# Patient Record
Sex: MALE | Race: WHITE | NOT HISPANIC OR LATINO | Employment: FULL TIME | URBAN - METROPOLITAN AREA
[De-identification: names, ages, dates, MRNs, and addresses within clinical notes are randomized per-mention and may not be internally consistent; named-entity substitution may affect disease eponyms.]

---

## 2017-07-16 ENCOUNTER — OFFICE VISIT (OUTPATIENT)
Dept: URGENT CARE | Age: 57
End: 2017-07-16
Payer: COMMERCIAL

## 2017-07-16 ENCOUNTER — APPOINTMENT (OUTPATIENT)
Dept: RADIOLOGY | Age: 57
End: 2017-07-16
Payer: COMMERCIAL

## 2017-07-16 ENCOUNTER — TRANSCRIBE ORDERS (OUTPATIENT)
Dept: URGENT CARE | Age: 57
End: 2017-07-16

## 2017-07-16 DIAGNOSIS — M25.561 PAIN IN RIGHT KNEE: ICD-10-CM

## 2017-07-16 PROCEDURE — 73564 X-RAY EXAM KNEE 4 OR MORE: CPT

## 2017-07-16 PROCEDURE — 99213 OFFICE O/P EST LOW 20 MIN: CPT

## 2017-07-17 ENCOUNTER — ALLSCRIPTS OFFICE VISIT (OUTPATIENT)
Dept: OTHER | Facility: OTHER | Age: 57
End: 2017-07-17

## 2017-07-27 ENCOUNTER — TRANSCRIBE ORDERS (OUTPATIENT)
Dept: ADMINISTRATIVE | Age: 57
End: 2017-07-27

## 2017-07-27 ENCOUNTER — APPOINTMENT (OUTPATIENT)
Dept: LAB | Age: 57
End: 2017-07-27
Payer: COMMERCIAL

## 2017-07-27 ENCOUNTER — GENERIC CONVERSION - ENCOUNTER (OUTPATIENT)
Dept: OTHER | Facility: OTHER | Age: 57
End: 2017-07-27

## 2017-07-27 DIAGNOSIS — M25.561 PAIN IN RIGHT KNEE: ICD-10-CM

## 2017-07-27 LAB — URATE SERPL-MCNC: 7.5 MG/DL (ref 4.2–8)

## 2017-07-27 PROCEDURE — 36415 COLL VENOUS BLD VENIPUNCTURE: CPT

## 2017-07-27 PROCEDURE — 84550 ASSAY OF BLOOD/URIC ACID: CPT

## 2017-07-31 ENCOUNTER — ALLSCRIPTS OFFICE VISIT (OUTPATIENT)
Dept: OTHER | Facility: OTHER | Age: 57
End: 2017-07-31

## 2017-08-24 ENCOUNTER — TRANSCRIBE ORDERS (OUTPATIENT)
Dept: ADMINISTRATIVE | Age: 57
End: 2017-08-24

## 2017-08-24 ENCOUNTER — APPOINTMENT (OUTPATIENT)
Dept: RADIOLOGY | Age: 57
End: 2017-08-24
Payer: COMMERCIAL

## 2017-08-24 ENCOUNTER — APPOINTMENT (OUTPATIENT)
Dept: LAB | Age: 57
End: 2017-08-24
Payer: COMMERCIAL

## 2017-08-24 DIAGNOSIS — M54.5 LOW BACK PAIN, UNSPECIFIED BACK PAIN LATERALITY, UNSPECIFIED CHRONICITY, WITH SCIATICA PRESENCE UNSPECIFIED: Primary | ICD-10-CM

## 2017-08-24 DIAGNOSIS — M54.5 LOW BACK PAIN, UNSPECIFIED BACK PAIN LATERALITY, UNSPECIFIED CHRONICITY, WITH SCIATICA PRESENCE UNSPECIFIED: ICD-10-CM

## 2017-08-24 LAB
25(OH)D3 SERPL-MCNC: 26.4 NG/ML (ref 30–100)
ALBUMIN SERPL BCP-MCNC: 3.7 G/DL (ref 3.5–5)
ALP SERPL-CCNC: 105 U/L (ref 46–116)
ALT SERPL W P-5'-P-CCNC: 48 U/L (ref 12–78)
ANION GAP SERPL CALCULATED.3IONS-SCNC: 5 MMOL/L (ref 4–13)
AST SERPL W P-5'-P-CCNC: 27 U/L (ref 5–45)
BASOPHILS # BLD AUTO: 0.06 THOUSANDS/ΜL (ref 0–0.1)
BASOPHILS NFR BLD AUTO: 2 % (ref 0–1)
BILIRUB SERPL-MCNC: 0.57 MG/DL (ref 0.2–1)
BILIRUB UR QL STRIP: NEGATIVE
BUN SERPL-MCNC: 15 MG/DL (ref 5–25)
CALCIUM SERPL-MCNC: 9 MG/DL (ref 8.3–10.1)
CHLORIDE SERPL-SCNC: 106 MMOL/L (ref 100–108)
CLARITY UR: CLEAR
CO2 SERPL-SCNC: 30 MMOL/L (ref 21–32)
COLOR UR: YELLOW
CREAT SERPL-MCNC: 1.17 MG/DL (ref 0.6–1.3)
EOSINOPHIL # BLD AUTO: 0.16 THOUSAND/ΜL (ref 0–0.61)
EOSINOPHIL NFR BLD AUTO: 5 % (ref 0–6)
ERYTHROCYTE [DISTWIDTH] IN BLOOD BY AUTOMATED COUNT: 13.5 % (ref 11.6–15.1)
ERYTHROCYTE [SEDIMENTATION RATE] IN BLOOD: 2 MM/HOUR (ref 0–10)
GFR SERPL CREATININE-BSD FRML MDRD: 70 ML/MIN/1.73SQ M
GLUCOSE P FAST SERPL-MCNC: 110 MG/DL (ref 65–99)
GLUCOSE UR STRIP-MCNC: NEGATIVE MG/DL
HCT VFR BLD AUTO: 45.1 % (ref 36.5–49.3)
HGB BLD-MCNC: 16 G/DL (ref 12–17)
HGB UR QL STRIP.AUTO: NEGATIVE
KETONES UR STRIP-MCNC: NEGATIVE MG/DL
LEUKOCYTE ESTERASE UR QL STRIP: NEGATIVE
LYMPHOCYTES # BLD AUTO: 1.44 THOUSANDS/ΜL (ref 0.6–4.47)
LYMPHOCYTES NFR BLD AUTO: 41 % (ref 14–44)
MCH RBC QN AUTO: 30.9 PG (ref 26.8–34.3)
MCHC RBC AUTO-ENTMCNC: 35.5 G/DL (ref 31.4–37.4)
MCV RBC AUTO: 87 FL (ref 82–98)
MONOCYTES # BLD AUTO: 0.5 THOUSAND/ΜL (ref 0.17–1.22)
MONOCYTES NFR BLD AUTO: 14 % (ref 4–12)
NEUTROPHILS # BLD AUTO: 1.34 THOUSANDS/ΜL (ref 1.85–7.62)
NEUTS SEG NFR BLD AUTO: 38 % (ref 43–75)
NITRITE UR QL STRIP: NEGATIVE
NRBC BLD AUTO-RTO: 0 /100 WBCS
PH UR STRIP.AUTO: 6 [PH] (ref 4.5–8)
PLATELET # BLD AUTO: 162 THOUSANDS/UL (ref 149–390)
PMV BLD AUTO: 10.1 FL (ref 8.9–12.7)
POTASSIUM SERPL-SCNC: 4.3 MMOL/L (ref 3.5–5.3)
PROT SERPL-MCNC: 6.7 G/DL (ref 6.4–8.2)
PROT UR STRIP-MCNC: NEGATIVE MG/DL
RBC # BLD AUTO: 5.17 MILLION/UL (ref 3.88–5.62)
SODIUM SERPL-SCNC: 141 MMOL/L (ref 136–145)
SP GR UR STRIP.AUTO: 1.01 (ref 1–1.03)
URATE SERPL-MCNC: 2.8 MG/DL (ref 4.2–8)
UROBILINOGEN UR QL STRIP.AUTO: 0.2 E.U./DL
WBC # BLD AUTO: 3.51 THOUSAND/UL (ref 4.31–10.16)

## 2017-08-24 PROCEDURE — 85025 COMPLETE CBC W/AUTO DIFF WBC: CPT

## 2017-08-24 PROCEDURE — 36415 COLL VENOUS BLD VENIPUNCTURE: CPT

## 2017-08-24 PROCEDURE — 82306 VITAMIN D 25 HYDROXY: CPT

## 2017-08-24 PROCEDURE — 72200 X-RAY EXAM SI JOINTS: CPT

## 2017-08-24 PROCEDURE — 85652 RBC SED RATE AUTOMATED: CPT

## 2017-08-24 PROCEDURE — 72110 X-RAY EXAM L-2 SPINE 4/>VWS: CPT

## 2017-08-24 PROCEDURE — 81003 URINALYSIS AUTO W/O SCOPE: CPT | Performed by: INTERNAL MEDICINE

## 2017-08-24 PROCEDURE — 80053 COMPREHEN METABOLIC PANEL: CPT

## 2017-08-24 PROCEDURE — 84550 ASSAY OF BLOOD/URIC ACID: CPT

## 2017-09-09 ENCOUNTER — APPOINTMENT (OUTPATIENT)
Dept: LAB | Age: 57
End: 2017-09-09
Payer: COMMERCIAL

## 2017-09-09 ENCOUNTER — TRANSCRIBE ORDERS (OUTPATIENT)
Dept: ADMINISTRATIVE | Age: 57
End: 2017-09-09

## 2017-09-09 DIAGNOSIS — Z83.3 FAMILY HISTORY OF DIABETES MELLITUS: Primary | ICD-10-CM

## 2017-09-09 DIAGNOSIS — Z83.3 FAMILY HISTORY OF DIABETES MELLITUS: ICD-10-CM

## 2017-09-09 LAB
CHOLEST SERPL-MCNC: 116 MG/DL (ref 50–200)
EST. AVERAGE GLUCOSE BLD GHB EST-MCNC: 105 MG/DL
HBA1C MFR BLD: 5.3 % (ref 4.2–6.3)
HDLC SERPL-MCNC: 33 MG/DL (ref 40–60)
LDLC SERPL CALC-MCNC: 54 MG/DL (ref 0–100)
T4 FREE SERPL-MCNC: 1.09 NG/DL (ref 0.76–1.46)
TRIGL SERPL-MCNC: 143 MG/DL
TSH SERPL DL<=0.05 MIU/L-ACNC: 5.02 UIU/ML (ref 0.36–3.74)

## 2017-09-09 PROCEDURE — 84439 ASSAY OF FREE THYROXINE: CPT

## 2017-09-09 PROCEDURE — 84443 ASSAY THYROID STIM HORMONE: CPT

## 2017-09-09 PROCEDURE — 80061 LIPID PANEL: CPT

## 2017-09-09 PROCEDURE — 36415 COLL VENOUS BLD VENIPUNCTURE: CPT

## 2017-09-09 PROCEDURE — 83036 HEMOGLOBIN GLYCOSYLATED A1C: CPT

## 2017-11-07 ENCOUNTER — HOSPITAL ENCOUNTER (EMERGENCY)
Facility: HOSPITAL | Age: 57
Discharge: HOME | End: 2017-11-07
Attending: EMERGENCY MEDICINE | Admitting: EMERGENCY MEDICINE
Payer: COMMERCIAL

## 2017-11-07 VITALS
WEIGHT: 185.19 LBS | DIASTOLIC BLOOD PRESSURE: 64 MMHG | HEART RATE: 79 BPM | OXYGEN SATURATION: 97 % | TEMPERATURE: 97.6 F | RESPIRATION RATE: 20 BRPM | SYSTOLIC BLOOD PRESSURE: 113 MMHG

## 2017-11-07 DIAGNOSIS — R55 VASOVAGAL EPISODE: ICD-10-CM

## 2017-11-07 DIAGNOSIS — I95.9 HYPOTENSION: Primary | ICD-10-CM

## 2017-11-07 LAB
ANION GAP SERPL CALCULATED.3IONS-SCNC: 8 MMOL/L (ref 4–13)
APTT PPP: 26 SECONDS (ref 23–35)
ATRIAL RATE: 67 BPM
BASOPHILS # BLD AUTO: 0.04 THOUSANDS/ΜL (ref 0–0.1)
BASOPHILS NFR BLD AUTO: 1 % (ref 0–1)
BUN SERPL-MCNC: 15 MG/DL (ref 5–25)
CALCIUM SERPL-MCNC: 9.3 MG/DL (ref 8.3–10.1)
CHLORIDE SERPL-SCNC: 106 MMOL/L (ref 100–108)
CO2 SERPL-SCNC: 29 MMOL/L (ref 21–32)
CREAT SERPL-MCNC: 1.28 MG/DL (ref 0.6–1.3)
EOSINOPHIL # BLD AUTO: 0.14 THOUSAND/ΜL (ref 0–0.61)
EOSINOPHIL NFR BLD AUTO: 4 % (ref 0–6)
ERYTHROCYTE [DISTWIDTH] IN BLOOD BY AUTOMATED COUNT: 12.2 % (ref 11.6–15.1)
GFR SERPL CREATININE-BSD FRML MDRD: 62 ML/MIN/1.73SQ M
GLUCOSE SERPL-MCNC: 142 MG/DL (ref 65–140)
HCT VFR BLD AUTO: 40.8 % (ref 36.5–49.3)
HGB BLD-MCNC: 14.7 G/DL (ref 12–17)
INR PPP: 1.04 (ref 0.86–1.16)
LYMPHOCYTES # BLD AUTO: 1.58 THOUSANDS/ΜL (ref 0.6–4.47)
LYMPHOCYTES NFR BLD AUTO: 51 % (ref 14–44)
MCH RBC QN AUTO: 31.1 PG (ref 26.8–34.3)
MCHC RBC AUTO-ENTMCNC: 36 G/DL (ref 31.4–37.4)
MCV RBC AUTO: 86 FL (ref 82–98)
MONOCYTES # BLD AUTO: 0.43 THOUSAND/ΜL (ref 0.17–1.22)
MONOCYTES NFR BLD AUTO: 14 % (ref 4–12)
NEUTROPHILS # BLD AUTO: 0.96 THOUSANDS/ΜL (ref 1.85–7.62)
NEUTS SEG NFR BLD AUTO: 30 % (ref 43–75)
P AXIS: 50 DEGREES
PLATELET # BLD AUTO: 198 THOUSANDS/UL (ref 149–390)
PMV BLD AUTO: 9.9 FL (ref 8.9–12.7)
POTASSIUM SERPL-SCNC: 3.8 MMOL/L (ref 3.5–5.3)
PR INTERVAL: 176 MS
PROTHROMBIN TIME: 13.9 SECONDS (ref 12.1–14.4)
QRS AXIS: 43 DEGREES
QRSD INTERVAL: 92 MS
QT INTERVAL: 384 MS
QTC INTERVAL: 405 MS
RBC # BLD AUTO: 4.72 MILLION/UL (ref 3.88–5.62)
SODIUM SERPL-SCNC: 143 MMOL/L (ref 136–145)
T WAVE AXIS: 59 DEGREES
TROPONIN I SERPL-MCNC: <0.02 NG/ML
VENTRICULAR RATE: 67 BPM
WBC # BLD AUTO: 3.15 THOUSAND/UL (ref 4.31–10.16)

## 2017-11-07 PROCEDURE — 85730 THROMBOPLASTIN TIME PARTIAL: CPT | Performed by: EMERGENCY MEDICINE

## 2017-11-07 PROCEDURE — 99284 EMERGENCY DEPT VISIT MOD MDM: CPT

## 2017-11-07 PROCEDURE — 93005 ELECTROCARDIOGRAM TRACING: CPT | Performed by: EMERGENCY MEDICINE

## 2017-11-07 PROCEDURE — 85025 COMPLETE CBC W/AUTO DIFF WBC: CPT | Performed by: EMERGENCY MEDICINE

## 2017-11-07 PROCEDURE — 85610 PROTHROMBIN TIME: CPT | Performed by: EMERGENCY MEDICINE

## 2017-11-07 PROCEDURE — 80048 BASIC METABOLIC PNL TOTAL CA: CPT | Performed by: EMERGENCY MEDICINE

## 2017-11-07 PROCEDURE — 84484 ASSAY OF TROPONIN QUANT: CPT | Performed by: EMERGENCY MEDICINE

## 2017-11-07 PROCEDURE — 36415 COLL VENOUS BLD VENIPUNCTURE: CPT | Performed by: EMERGENCY MEDICINE

## 2017-11-07 PROCEDURE — 96360 HYDRATION IV INFUSION INIT: CPT

## 2017-11-07 RX ORDER — GABAPENTIN 300 MG/1
100 CAPSULE ORAL 3 TIMES DAILY
COMMUNITY

## 2017-11-07 RX ORDER — LEVOTHYROXINE SODIUM 0.1 MG/1
100 TABLET ORAL DAILY
COMMUNITY

## 2017-11-07 RX ORDER — ATORVASTATIN CALCIUM 10 MG/1
10 TABLET, FILM COATED ORAL DAILY
COMMUNITY

## 2017-11-07 RX ORDER — FEBUXOSTAT 80 MG/1
80 TABLET, FILM COATED ORAL DAILY
COMMUNITY
End: 2022-05-06

## 2017-11-07 RX ADMIN — SODIUM CHLORIDE 1000 ML: 0.9 INJECTION, SOLUTION INTRAVENOUS at 04:06

## 2017-11-07 NOTE — ED PROVIDER NOTES
History  Chief Complaint   Patient presents with    Syncope     EMS reports syncopal episode this am after getting up to use restroom  Pt reports diaphoresis upon episode  EMS reports when they arrived on scene be was orthostatically hypotensive, went sitting up ems reports pts systolic bp was 79 and pt had witnessed syncopal episdoe  In ED pt c/o a little lightheadedness but denies CP/SOB  Patient presents to        the emergency department via ambulance for evaluation of a near syncopal episode which occurred 2 times prior to arrival   Patient states he got up to go to the bathroom and while standing and urinating he bent over to  a tissue and got dizzy and almost passed out  His wife heard a thump as the patient bumped into the wall and window and when she went in he dropped to the ground but did not pass out and was able to converse throughout  He did give blood last evening and has not been drinking aggressive fluids  Currently he feels fatigued and cold but denies chest pain palpitations jaw arm neck pain  He denies dizziness or lightheadedness  He denies abdominal pain  He denies ill contacts  He denies fever chills and rash  He denies injury from the fall  Prior to Admission Medications   Prescriptions Last Dose Informant Patient Reported? Taking?   atorvastatin (LIPITOR) 10 mg tablet   Yes Yes   Sig: Take 10 mg by mouth daily   febuxostat (ULORIC) 80 MG TABS   Yes Yes   Sig: Take 80 mg by mouth daily   gabapentin (NEURONTIN) 300 mg capsule   Yes Yes   Sig: Take 100 mg by mouth 3 (three) times a day   levothyroxine 100 mcg tablet   Yes Yes   Sig: Take 100 mcg by mouth daily      Facility-Administered Medications: None       Past Medical History:   Diagnosis Date    Disease of thyroid gland     hypo    Hyperlipidemia     Neuropathy        Past Surgical History:   Procedure Laterality Date    TONSILLECTOMY         History reviewed  No pertinent family history    I have reviewed and agree with the history as documented  Social History   Substance Use Topics    Smoking status: Never Smoker    Smokeless tobacco: Never Used    Alcohol use Yes      Comment: "infrequent"        Review of Systems   Constitutional: Positive for fatigue  Negative for activity change, appetite change, chills, diaphoresis and fever  HENT: Negative  Negative for rhinorrhea  Eyes: Negative  Negative for photophobia and visual disturbance  Respiratory: Negative  Negative for cough, choking, chest tightness, shortness of breath, wheezing and stridor  Cardiovascular: Negative  Negative for chest pain, palpitations and leg swelling  Gastrointestinal: Negative  Negative for abdominal pain, blood in stool, constipation, diarrhea, nausea, rectal pain and vomiting  Endocrine: Negative  Genitourinary: Negative  Negative for dysuria, frequency and urgency  Musculoskeletal: Negative  Negative for back pain, neck pain and neck stiffness  Skin: Negative  Negative for rash and wound  Allergic/Immunologic: Negative  Neurological: Positive for dizziness, weakness and light-headedness  Negative for tremors, seizures, syncope, speech difficulty, numbness and headaches  Hematological: Negative  Does not bruise/bleed easily  Psychiatric/Behavioral: Negative          Physical Exam  ED Triage Vitals [11/07/17 0350]   Temperature Pulse Respirations Blood Pressure SpO2   97 6 °F (36 4 °C) 72 16 146/65 97 %      Temp Source Heart Rate Source Patient Position - Orthostatic VS BP Location FiO2 (%)   Oral Monitor Lying Right arm --      Pain Score       No Pain           Orthostatic Vital Signs  Vitals:    11/07/17 0437 11/07/17 0438 11/07/17 0439 11/07/17 0515   BP: (!) 76/53 104/58 100/57 113/64   Pulse: 88 70 79 79   Patient Position - Orthostatic VS: Standing - Orthostatic VS Lying - Orthostatic VS Sitting - Orthostatic VS Standing - Orthostatic VS       Physical Exam   Constitutional: He is oriented to person, place, and time  He appears well-developed and well-nourished  Nontoxic appearance without respiratory distress  Answers simple questions appropriately and engages in normal conversation  He moves all extremities spontaneously and to command  HENT:   Head: Normocephalic and atraumatic  Right Ear: External ear normal    Left Ear: External ear normal    Mouth/Throat: Oropharynx is clear and moist    No evidence of head or scalp or facial trauma   Eyes: Conjunctivae and EOM are normal  Pupils are equal, round, and reactive to light  Neck: Normal range of motion  Neck supple  Cardiovascular: Normal rate, regular rhythm, normal heart sounds and intact distal pulses  Pulmonary/Chest: Effort normal and breath sounds normal  No respiratory distress  He has no wheezes  He has no rales  He exhibits no tenderness  Abdominal: Soft  Bowel sounds are normal  He exhibits no distension and no mass  There is no tenderness  There is no rebound and no guarding  No hernia  Musculoskeletal: Normal range of motion  Neurological: He is alert and oriented to person, place, and time  He has normal reflexes  He displays normal reflexes  No cranial nerve deficit or sensory deficit  He exhibits normal muscle tone  Coordination normal    Skin: Skin is warm and dry  No rash noted  No erythema  No pallor  Psychiatric: He has a normal mood and affect  His behavior is normal  Judgment and thought content normal    Nursing note and vitals reviewed        ED Medications  Medications   sodium chloride 0 9 % bolus 1,000 mL (0 mL Intravenous Stopped 11/7/17 0515)       Diagnostic Studies  Results Reviewed     Procedure Component Value Units Date/Time    Protime-INR [[id number]]  (Normal) Collected:  11/07/17 0425    Lab Status:  Final result Specimen:  Blood from Arm, Right Updated:  11/07/17 0443     Protime 13 9 seconds      INR 1 04    APTT [[id number]]  (Normal) Collected:  11/07/17 0425    Lab Status:  Final result Specimen:  Blood from Arm, Right Updated:  11/07/17 0443     PTT 26 seconds     Narrative: Therapeutic Heparin Range = 60-90 seconds    Troponin I [[id number]]  (Normal) Collected:  11/07/17 0358    Lab Status:  Final result Specimen:  Blood from Arm, Left Updated:  11/07/17 0424     Troponin I <0 02 ng/mL     Narrative:         [employer] Chemistry analyzer 99% cutoff is > 0 04 ng/mL in network labs    o cTnI 99% cutoff is useful only when applied to patients in the clinical setting of myocardial ischemia  o cTnI 99% cutoff should be interpreted in the context of clinical history, ECG findings and possibly cardiac imaging to establish correct diagnosis  o cTnI 99% cutoff may be suggestive but clearly not indicative of a coronary event without the clinical setting of myocardial ischemia  Basic metabolic panel [[id number]]  (Abnormal) Collected:  11/07/17 0358    Lab Status:  Final result Specimen:  Blood from Arm, Left Updated:  11/07/17 0416     Sodium 143 mmol/L      Potassium 3 8 mmol/L      Chloride 106 mmol/L      CO2 29 mmol/L      Anion Gap 8 mmol/L      BUN 15 mg/dL      Creatinine 1 28 mg/dL      Glucose 142 (H) mg/dL      Calcium 9 3 mg/dL      eGFR 62 ml/min/1 73sq m     Narrative:         National Kid[employer] Disease Education Program recommendations are as follows:  GFR calculation is accurate only with a steady state creatinine  Chronic Kidney disease less than 60 ml/min/1 73 sq  meters  Kidney failure less than 15 ml/min/1 73 sq  meters      CBC and differential [[id number]]  (Abnormal) Collected:  11/07/17 0358    Lab Status:  Final result Specimen:  Blood from Arm, Left Updated:  11/07/17 0404     WBC 3 15 (L) Thousand/uL      RBC 4 72 Million/uL      Hemoglobin 14 7 g/dL      Hematocrit 40 8 %      MCV 86 fL      MCH 31 1 pg      MCHC 36 0 g/dL      RDW 12 2 %      MPV 9 9 fL      Platelets 444 Thousands/uL      Neutrophils Relative 30 (L) %      Lymphocytes Relative 51 (H) %      Monocytes Relative 14 (H) %      Eosinophils Relative 4 %      Basophils Relative 1 %      Neutrophils Absolute 0 96 (L) Thousands/µL      Lymphocytes Absolute 1 58 Thousands/µL      Monocytes Absolute 0 43 Thousand/µL      Eosinophils Absolute 0 14 Thousand/µL      Basophils Absolute 0 04 Thousands/µL                  No orders to display              Procedures  ECG 12 Lead Documentation  Date/Time: 11/7/2017 3:52 AM  Performed by: Jenny Fenton  Authorized by: Jenny Fenton     ECG reviewed by me, the ED Provider: yes    Patient location:  ED  Previous ECG:     Previous ECG:  Unavailable  Interpretation:     Interpretation: normal    Rate:     ECG rate:  67    ECG rate assessment: normal    Rhythm:     Rhythm: sinus rhythm    Ectopy:     Ectopy: none    QRS:     QRS axis:  Normal    QRS intervals:  Normal  Conduction:     Conduction: normal    ST segments:     ST segments:  Normal  T waves:     T waves: normal             Phone Contacts  ED Phone Contact    ED Course  ED Course as of Nov 07 0517 Tue Nov 07, 2017   1127 Patient is stable for discharge  His blood pressure has improved specially standing up  He is asymptomatic after fluids  His blood work and troponin and EKG are unremarkable  I suspect a vasovagal etiology to his presentation  I discussed signs and symptoms that would require return to the emergency department  I encouraged aggressive fluid intake  MDM  CritCare Time    Disposition  Final diagnoses:   Hypotension   Vasovagal episode     Time reflects when diagnosis was documented in both MDM as applicable and the Disposition within this note     Time User Action Codes Description Comment    11/7/2017  5:14 AM Kayla Bacon Add [I95 9] Hypotension     11/7/2017  5:15 AM Constance Shah Add [R55] Vasovagal episode       ED Disposition     ED Disposition Condition Comment    Discharge  Tonyelyalicia Brooks discharge to home/self care      Condition at discharge: Stable Follow-up Information     Follow up With Specialties Details Why Contact Info    Private physician  Schedule an appointment as soon as possible for a visit          Patient's Medications   Discharge Prescriptions    No medications on file     No discharge procedures on file      ED Provider  Electronically Signed by           Marion Monahan MD  11/07/17 9691       Marion Monahan MD  11/07/17 9755

## 2017-11-07 NOTE — DISCHARGE INSTRUCTIONS
Hypotension   WHAT YOU NEED TO KNOW:   What is hypotension? Hypotension is a condition that causes your blood pressure (BP) to drop lower than it should be  Hypotension may be mild, serious, or life-threatening  What are the most common types of hypotension? · Acute:  Acute hypotension is a sudden drop in your BP that may be life-threatening  · Constitutional:  Constitutional hypotension means your BP is lower than it should be most or all of the time  This is a chronic condition that occurs with no known medical cause  · Orthostatic:  Orthostatic hypotension normally occurs when you stand up from a sitting or lying position  It is also called postural hypotension  · Postprandial:  Postprandial hypotension means your BP becomes too low after you eat a meal  Your BP may drop within 2 hours after you eat, and is more common when you eat meals high in carbohydrates  What causes hypotension? · Anemia or blood loss    · Nervous system, heart, or adrenal disorders    · Dehydration from not drinking enough liquids, frequent vomiting, diarrhea, or severe burns    · Some medicines, such as those used to treat high blood pressure, heart conditions, pain, depression, or cancer    · A blood infection (sepsis)  What increases my risk for hypotension? · Increasing age    · Drug and alcohol use    · Being bedridden for a long period of time    · Low body weight    · Hemodialysis    · Medical conditions such as diabetes, Parkinson disease, and Alzheimer disease  What are the signs and symptoms of hypotension? · Feeling anxious, nauseated, tired, or weak    · Lightheadedness, dizziness, or fainting    · Increased sweating, palpitations (fast, forceful heartbeats), tremors, or a seizure     · Headache or pain in your neck, shoulders, chest, lower back, buttocks, or legs    · Blurred vision or changes in vision    · Confusion, decreased memory, or inability to pay attention  How is hypotension diagnosed?   Your healthcare provider will ask about your symptoms, health conditions, and medicines  Tell him how often you have symptoms, and if they change during the day  Tell him if you recently have had diarrhea, vomiting, or blood loss  Your healthcare provider will carefully examine you, listen to your heart, and may check your eyes  He may check your body movements and sensations (ability to feel something that touches you)  You may also need the following:  · Blood pressure testing: This may be done while you lie down, sit, and stand  You may need to wear a BP monitor to record your BP for up to 24 hours  · Tilt table testing: You are secured on a table that changes your position  Your BP is checked when the table moves you into each position  What tests may help find the cause of my hypotension? Many times, hypotension is a symptom of another condition  You may need any of the following tests to find the cause of your hypotension:  · Blood tests:  A sample of your blood or urine may be checked for anemia or other conditions causing your hypotension  · EKG: This test records the electrical activity of your heart  It is used to check for damage or other heart problems that may be causing your hypotension  · Autonomic nervous system tests:  Your healthcare provider may check for changes in how fast your heart beats when you take deep breaths  He may also check for changes in your BP while you put your hand in ice cold water  · 24 hour urine test: During this test you will need to collect all of your urine for 24 hours  You will urinate into a container and the urine will be put into a jug  The jug will need to be kept cold  If you urinate during the night, you will need to save that urine  Caregivers will measure and record how much you urinate  At the end of 24 hours, the urine will be sent to a lab for tests  · Echocardiogram:  This is a type of ultrasound, also called an echo   An ultrasound uses sound waves to show pictures of your heart on a monitor  An ultrasound may be done to show how your heart moves when it beats  How is hypotension treated? Your healthcare provider will work with you to find the cause of your hypotension and help treat your symptoms  You may need the following:  · Compression stockings or abdominal binder: These may help blood return to your heart and decrease your hypotension  · IV fluids: These may be used to increase your BP if you are dehydrated or have blood loss or sepsis  · Medicines:      ¨ Alpha-adrenoreceptor agonists: These medicines may increase your BP and decrease your symptoms  ¨ Steroids: This medicine helps prevent salt loss from your body  Steroids may also help increase the amount of fluid in your body and raise your BP  ¨ Vasopressors: These medicines help constrict (make smaller) your blood vessels and increase your BP  Vasopressor medicines may increase the blood flow to your brain and help decrease your symptoms  ¨ Antidiuretic hormone: This medicine helps control your BP and helps decrease your need to urinate during the night  ¨ Antiparkinson medicine: This medicine may help increase your standing BP and decrease your symptoms  What are the risks of hypotension? Without treatment, your symptoms may get worse  You may faint or fall often, which can lead to injuries, such as a broken bone  You may be at increased risk for depression, confusion, and memory problems  Hypotension may cause decreased blood flow to your brain and heart  This may lead to a stroke or heart attack and can be life-threatening  Sepsis-related hypotension is life-threatening without treatment  How can I manage my symptoms? · Change your position slowly:  When you get out of bed, sit up first, then slowly move your legs to the side of the bed  If you are not having any symptoms, slowly stand up  If you have symptoms, sit down right away      · Avoid straining: Activities and movements that cause you to strain can cause a drop in your BP  Activities to avoid include lifting, coughing, and other movements that increase the feeling of pressure in your chest     · Avoid the heat: This can cause a decrease in your BP  Stay inside during very hot days, or limit the amount of time you are outside  Do not take hot baths  · Exercise and do physical counter maneuvers:  Ask your healthcare provider about the best exercise plan for you  Physical counter maneuvers may help to increase your BP and increase blood flow to your heart  They include crossing your legs, squatting, and bending at the waist  You can also rise up on your toes while you are standing, and tighten your thigh muscles  · Drink liquids as directed:  Ask your healthcare provider how much liquid to drink each day and which liquids are best for you  Drink 500 milliliters (½ liter) of liquid quickly in the morning or before meals to help increase your BP  Your healthcare provider may tell you to drink 2 cups of coffee with, or after, breakfast and lunch  The caffeine in the coffee can help prevent a drop in your BP  Your healthcare provider may also give you caffeine pills  · Change how you eat meals: If your BP drops after you eat large meals, try to eat smaller meals more often  Eat foods low in carbohydrates and cholesterol to help prevent BP drops after you eat  Ask if you need to increase the amount of sodium (salt) you eat each day  · Raise the head of your bed:  Raise the head of your bed 4 to 8 inches  This can help prevent morning BP drops and decrease the need to urinate during the night  · Do not drink alcohol:  Alcohol can make your symptoms worse  Ask for information if you need help quitting  When should I contact my healthcare provider? · You vomit several times or have diarrhea, and you cannot drink liquid  · You have a fever       · You have new or increased symptoms, such as dizziness, weakness, or fainting  · Your legs, ankles, and feet are swollen, or you gain weight for no known reason  · You have questions or concerns about your condition or care  When should I seek immediate care or call 911? · You become confused or cannot speak  · You urinate very little or not at all  · You have a seizure  · You have chest pain or trouble breathing  · You have changes in vision or cannot see  CARE AGREEMENT:   You have the right to help plan your care  Learn about your health condition and how it may be treated  Discuss treatment options with your caregivers to decide what care you want to receive  You always have the right to refuse treatment  The above information is an  only  It is not intended as medical advice for individual conditions or treatments  Talk to your doctor, nurse or pharmacist before following any medical regimen to see if it is safe and effective for you  © 2017 [employer] Information is for End User's use only and may not be sold, redistributed or otherwise used for commercial purposes  All illustrations and images included in CareNotes® are the copyrighted property of [employer]  or [employer]  Near Syncope   WHAT YOU NEED TO KNOW:   What is near syncope? Near syncope, also called presyncope, is the feeling that you may faint (lose consciousness), but you do not  Each time you have this feeling is called a near syncope episode  What increases my risk for near syncope? Near syncope is often caused by a drop in your blood pressure that happens when you stand up quickly  The following can increase your risk for near syncope:  · Certain medicines, such as medicine to lower your blood pressure    · Dehydration    · Low sodium or blood sugar levels    · An abnormal heart rhythm    · Hyperventilation (breathing too quickly)  What signs and symptoms may occur before a near syncope episode?    · Feeling dizzy or lightheaded    · Nausea, feeling warm, sweating, or clammy skin    · Blurred vision, or vision that is blacking out    · Confusion    · Trouble breathing    · A fast or fluttering heartbeat, chest pain, or a weak pulse  How is the cause of near syncope diagnosed? Your healthcare provider will examine you  He or she will ask if you have other medical conditions  He or she may order the following tests to find out what is causing your symptoms:  · Blood tests  may be done to check your electrolyte levels, such as sodium  A problem with your electrolytes can lead to syncope  · Telemetry  is continuous monitoring of your heart rhythm  Sticky pads placed on your skin connect to an EKG machine that records your heart rhythm  · An echocardiogram  is a type of ultrasound  Sound waves are used to show the structure and function of your heart  · A stress test  may show the changes that take place in your heart while it is under stress  Stress may be placed on your heart with exercise or medicine  Ask for more information about this test     · A tilt table test  is used to check your heart and your blood pressure when you change positions  You will lie on a table during this test  The table will move in different positions  The strength and rhythm of your heartbeat will be measured as the table moves  How is near syncope treated? Treatment depends on the cause of your near syncope  You may  need any of the following:  · Medicines  may be needed to help your heart pump strongly and regularly  Your healthcare provider may also make changes to any medicines that are causing syncope  · Tilt training  involves training yourself to stand for 10 to 30 minutes each day against a wall  This helps your body decrease the effects of posture changes and reduces the number of fainting spells  What can I do to manage near syncope? · Keep a record of your near syncope episodes    Include your symptoms and your activity before and after the episode  The record can help your healthcare provider find the cause of your near syncope and help you manage episodes  · Sit or lie down when needed  This includes when you feel dizzy, your throat is getting tight, and your vision changes  Raise your legs above the level of your heart  · Take slow, deep breaths if you start to breathe faster with anxiety or fear  This can help decrease dizziness and the feeling that you might faint  · Check your blood pressure often  This is important if you take medicine to lower your blood pressure  Check your blood pressure when you are lying down and when you are standing  Ask how often to check during the day  Keep a record of your blood pressure numbers  Your healthcare provider may use the record to help plan your treatment  What can I do to prevent a near syncope episode? · Move slowly and let yourself get used to one position before you move to another position  This is very important when you change from a lying or sitting position to a standing position  Take some deep breaths before you stand up from a lying position  Stand up slowly  Sudden movements may cause a fainting spell  Sit on the side of the bed or couch for a few minutes before you stand up  If you are on bedrest, try to be upright for about 2 hours each day, or as directed  Do not lock your legs if you are standing for a long period of time  Move your legs and bend your knees to keep blood flowing  · Follow your healthcare provider's recommendations  Your provider may  recommend that you drink more liquids to prevent dehydration  You may also need to have more salt to keep your blood pressure from dropping too low and causing syncope  Your provider will tell you how much liquid and sodium to have each day  · Watch for signs of low blood sugar  These include hunger, nervousness, sweating, and fast or fluttery heartbeats   Talk with your healthcare provider about ways to keep your blood sugar level steady  · Do not strain if you are constipated  You may faint if you strain to have a bowel movement  Walking is the best way to get your bowels moving  Eat foods high in fiber to make it easier to have a bowel movement  Good examples are high-fiber cereals, beans, vegetables, and whole-grain breads  Prune juice may help make bowel movements softer  · Be careful in hot weather  Heat can cause a syncope episode  Limit activity done outside on hot days  Physical activity in hot weather can lead to dehydration  This can cause an episode  When should I seek immediate care? · You have sudden chest pain  · You have trouble breathing or shortness of breath  · You have vision changes, are sweating, and have nausea while you are sitting or lying down  · You feel dizzy or flushed and your heart is fluttering  · You lose consciousness  · You cannot use your arm, hand, foot, or leg, or it feels weak  · You have trouble speaking or understanding others when they speak  When should I contact my healthcare provider? · You have new or worsening symptoms  · Your heart beats faster or slower than usual      · You have questions or concerns about your condition or care  CARE AGREEMENT:   You have the right to help plan your care  Learn about your health condition and how it may be treated  Discuss treatment options with your caregivers to decide what care you want to receive  You always have the right to refuse treatment  The above information is an  only  It is not intended as medical advice for individual conditions or treatments  Talk to your doctor, nurse or pharmacist before following any medical regimen to see if it is safe and effective for you  © 2017 [employer] Information is for End User's use only and may not be sold, redistributed or otherwise used for commercial purposes   All illustrations and images included in [employer] [address] are the copyrighted property of [employer]  or [employer]

## 2017-11-07 NOTE — ED NOTES
ORTHOSTATIC VITAL SIGNS BLOOD PRESSURE HEART RATE           LYING 104/58 70           SITTING 100/57 [address], [facility]  11/07/17 5304

## 2017-11-07 NOTE — ED NOTES
ORTHOSTATIC VITAL SIGNS BLOOD PRESSURE HEART RATE           LYING 104/58 70           SITTING 100/57 [address] [facility] [address], [facility]  11/07/17 2496

## 2017-11-10 ENCOUNTER — GENERIC CONVERSION - ENCOUNTER (OUTPATIENT)
Dept: OTHER | Facility: OTHER | Age: 57
End: 2017-11-10

## 2017-11-11 ENCOUNTER — APPOINTMENT (OUTPATIENT)
Dept: LAB | Age: 57
End: 2017-11-11
Payer: COMMERCIAL

## 2017-11-11 ENCOUNTER — TRANSCRIBE ORDERS (OUTPATIENT)
Dept: ADMINISTRATIVE | Age: 57
End: 2017-11-11

## 2017-11-11 DIAGNOSIS — E03.9 HYPOTHYROIDISM, UNSPECIFIED TYPE: ICD-10-CM

## 2017-11-11 DIAGNOSIS — E03.9 HYPOTHYROIDISM, UNSPECIFIED TYPE: Primary | ICD-10-CM

## 2017-11-11 LAB
T4 FREE SERPL-MCNC: 0.99 NG/DL (ref 0.76–1.46)
TSH SERPL DL<=0.05 MIU/L-ACNC: 1.02 UIU/ML (ref 0.36–3.74)

## 2017-11-11 PROCEDURE — 84443 ASSAY THYROID STIM HORMONE: CPT

## 2017-11-11 PROCEDURE — 36415 COLL VENOUS BLD VENIPUNCTURE: CPT

## 2017-11-11 PROCEDURE — 84439 ASSAY OF FREE THYROXINE: CPT

## 2018-01-13 VITALS
SYSTOLIC BLOOD PRESSURE: 130 MMHG | TEMPERATURE: 98.3 F | DIASTOLIC BLOOD PRESSURE: 78 MMHG | RESPIRATION RATE: 16 BRPM | HEIGHT: 70 IN | WEIGHT: 196.38 LBS | BODY MASS INDEX: 28.12 KG/M2 | HEART RATE: 78 BPM

## 2018-01-13 VITALS
RESPIRATION RATE: 16 BRPM | TEMPERATURE: 97.7 F | DIASTOLIC BLOOD PRESSURE: 86 MMHG | HEART RATE: 80 BPM | SYSTOLIC BLOOD PRESSURE: 128 MMHG | HEIGHT: 70 IN | BODY MASS INDEX: 27.83 KG/M2 | WEIGHT: 194.38 LBS

## 2018-01-15 NOTE — RESULT NOTES
Verified Results  (1) URIC ACID [id number] 06:37AM Neela HARTMAN Order Number: [id number]     Test Name Result Flag Reference   URIC ACID 7 5 mg/dL  4 2-8 0   Specimen collection should occur prior to Metamizole administration due to the potential for falsely depressed results

## 2018-01-22 VITALS
HEART RATE: 84 BPM | BODY MASS INDEX: 26.13 KG/M2 | SYSTOLIC BLOOD PRESSURE: 144 MMHG | TEMPERATURE: 98.4 F | WEIGHT: 182.5 LBS | DIASTOLIC BLOOD PRESSURE: 74 MMHG | RESPIRATION RATE: 14 BRPM | HEIGHT: 70 IN

## 2018-10-09 ENCOUNTER — TELEPHONE (OUTPATIENT)
Dept: FAMILY MEDICINE CLINIC | Facility: CLINIC | Age: 58
End: 2018-10-09

## 2022-07-12 PROBLEM — M15.0 PRIMARY GENERALIZED (OSTEO)ARTHRITIS: Status: ACTIVE | Noted: 2022-07-12

## 2022-07-12 PROBLEM — M54.16 CHRONIC LUMBAR RADICULOPATHY: Status: ACTIVE | Noted: 2022-07-12

## 2022-07-12 PROBLEM — E78.2 MIXED HYPERLIPIDEMIA: Status: ACTIVE | Noted: 2022-07-12

## 2022-07-12 PROBLEM — E03.9 ACQUIRED HYPOTHYROIDISM: Status: ACTIVE | Noted: 2022-07-12

## 2022-07-12 PROBLEM — D36.10 SCHWANNOMA: Status: ACTIVE | Noted: 2022-07-12

## 2022-07-12 PROBLEM — M1A.0710 IDIOPATHIC CHRONIC GOUT OF RIGHT FOOT WITHOUT TOPHUS: Status: ACTIVE | Noted: 2022-07-12

## 2022-07-12 PROBLEM — M72.2 PLANTAR FASCIITIS, RIGHT: Status: ACTIVE | Noted: 2022-07-12

## 2022-07-12 PROBLEM — G62.9 SMALL FIBER NEUROPATHY: Status: ACTIVE | Noted: 2022-07-12

## 2022-07-12 PROBLEM — L40.0 PLAQUE PSORIASIS: Status: ACTIVE | Noted: 2022-07-12

## 2022-07-12 PROBLEM — M47.816 LUMBAR SPONDYLOSIS: Status: ACTIVE | Noted: 2022-07-12

## 2022-08-26 ENCOUNTER — OFFICE VISIT (OUTPATIENT)
Dept: FAMILY MEDICINE CLINIC | Facility: CLINIC | Age: 62
End: 2022-08-26
Payer: COMMERCIAL

## 2022-08-26 VITALS
BODY MASS INDEX: 27.85 KG/M2 | SYSTOLIC BLOOD PRESSURE: 124 MMHG | OXYGEN SATURATION: 98 % | HEIGHT: 69 IN | DIASTOLIC BLOOD PRESSURE: 82 MMHG | RESPIRATION RATE: 16 BRPM | TEMPERATURE: 98.6 F | HEART RATE: 94 BPM | WEIGHT: 188 LBS

## 2022-08-26 DIAGNOSIS — Z23 ENCOUNTER FOR IMMUNIZATION: ICD-10-CM

## 2022-08-26 DIAGNOSIS — D33.4 SCHWANNOMA OF SPINAL CORD (HCC): ICD-10-CM

## 2022-08-26 DIAGNOSIS — E78.2 MIXED HYPERLIPIDEMIA: Primary | ICD-10-CM

## 2022-08-26 DIAGNOSIS — G62.9 SMALL FIBER NEUROPATHY: ICD-10-CM

## 2022-08-26 DIAGNOSIS — E55.9 VITAMIN D DEFICIENCY: ICD-10-CM

## 2022-08-26 DIAGNOSIS — Z12.5 PROSTATE CANCER SCREENING: ICD-10-CM

## 2022-08-26 DIAGNOSIS — M47.816 LUMBAR SPONDYLOSIS: ICD-10-CM

## 2022-08-26 DIAGNOSIS — E03.9 ACQUIRED HYPOTHYROIDISM: ICD-10-CM

## 2022-08-26 DIAGNOSIS — R73.01 IMPAIRED FASTING GLUCOSE: ICD-10-CM

## 2022-08-26 DIAGNOSIS — M1A.0710 IDIOPATHIC CHRONIC GOUT OF RIGHT FOOT WITHOUT TOPHUS: ICD-10-CM

## 2022-08-26 LAB — SL AMB POCT HEMOGLOBIN AIC: 5.1 (ref ?–6.5)

## 2022-08-26 PROCEDURE — 83036 HEMOGLOBIN GLYCOSYLATED A1C: CPT | Performed by: FAMILY MEDICINE

## 2022-08-26 PROCEDURE — 90715 TDAP VACCINE 7 YRS/> IM: CPT | Performed by: FAMILY MEDICINE

## 2022-08-26 PROCEDURE — 90471 IMMUNIZATION ADMIN: CPT | Performed by: FAMILY MEDICINE

## 2022-08-26 PROCEDURE — 3725F SCREEN DEPRESSION PERFORMED: CPT | Performed by: FAMILY MEDICINE

## 2022-08-26 PROCEDURE — 90750 HZV VACC RECOMBINANT IM: CPT | Performed by: FAMILY MEDICINE

## 2022-08-26 PROCEDURE — 99204 OFFICE O/P NEW MOD 45 MIN: CPT | Performed by: FAMILY MEDICINE

## 2022-08-26 PROCEDURE — 90472 IMMUNIZATION ADMIN EACH ADD: CPT | Performed by: FAMILY MEDICINE

## 2022-08-26 RX ORDER — MELOXICAM 15 MG/1
15 TABLET ORAL DAILY
COMMUNITY
Start: 2022-07-08 | End: 2022-09-15 | Stop reason: ALTCHOICE

## 2022-08-26 RX ORDER — GABAPENTIN 300 MG/1
1 CAPSULE ORAL 2 TIMES DAILY
COMMUNITY

## 2022-08-26 RX ORDER — ATORVASTATIN CALCIUM 10 MG/1
TABLET, FILM COATED ORAL
COMMUNITY

## 2022-08-26 RX ORDER — LEVOTHYROXINE SODIUM 0.1 MG/1
TABLET ORAL
COMMUNITY
End: 2022-08-26

## 2022-08-26 NOTE — ASSESSMENT & PLAN NOTE
No recent episodes  Uric acid 3 3 in July 2022  Continue uloric 80 mg qd  Sees Rheumatology and his next appointment is in Jan 2023

## 2022-08-26 NOTE — ASSESSMENT & PLAN NOTE
TSH 2 98 in July 2022  Continue levothyroxine 100 mcg qd  Pt sees Endocrinology and his next appointment is in Sept 2022

## 2022-08-26 NOTE — PROGRESS NOTES
BMI Counseling: Body mass index is 27 56 kg/m²  The BMI is above normal  Nutrition recommendations include decreasing portion sizes, encouraging healthy choices of fruits and vegetables, consuming healthier snacks and moderation in carbohydrate intake  Exercise recommendations include exercising 3-5 times per week  No pharmacotherapy was ordered  Rationale for BMI follow-up plan is due to patient being overweight or obese  Depression Screening and Follow-up Plan: Patient was screened for depression during today's encounter  They screened negative with a PHQ-2 score of 0  Assessment/Plan:         Problem List Items Addressed This Visit        Endocrine    Impaired fasting glucose     A1C done today and was 5 1  Relevant Orders    POCT hemoglobin A1c (Completed)    Acquired hypothyroidism     TSH 2 98 in July 2022  Continue levothyroxine 100 mcg qd  Pt sees Endocrinology and his next appointment is in Sept 2022  Nervous and Auditory    Small fiber neuropathy     Has been stable on gabapentin 300 bid  Pt has seen Neurology  Musculoskeletal and Integument    Lumbar spondylosis     Pt gets pain at times and takes meloxicam 15 mg qd as needed  Back exercises given  Idiopathic chronic gout of right foot without tophus     No recent episodes  Uric acid 3 3 in July 2022  Continue uloric 80 mg qd  Sees Rheumatology and his next appointment is in Jan 2023  Other    Vitamin D deficiency     Recheck level  Relevant Orders    Vitamin D 25 hydroxy    Mixed hyperlipidemia - Primary     LDL 72 in July 2022  Continue atorvastatin 10 mg qhs  Advised pt to follow a low cholesterol diet and to exercise on a regular basis            Relevant Medications    atorvastatin (LIPITOR) 10 mg tablet      Other Visit Diagnoses     Prostate cancer screening        Relevant Orders    PSA, Total Screen    Encounter for immunization        Relevant Orders    TDAP VACCINE GREATER THAN OR EQUAL TO 8YO IM (Completed)    Zoster Vaccine Recombinant IM (Completed)    Schwannoma of spinal cord (HCC)                Subjective:      Patient ID: Ba Zheng is a 62 y o  male  Pt here for new pt visit  Has hx of HL, gout, hypothyroidism, small fiber neuropathy, Vit D def, lumbar spondylosis  Pt doing well  No chest pain or sob  No recent flare-up of gout and sees Rheumatologist  Energy has been ok  No regular exercise due to plantar fasciitis right foot  Has seen Podiatry and uses orthotics in shoes  Has neuropathy in feet and legs and is on gabapentin 300 bid  Follows with Neurology Dr Mary Melendez  Takes vit D 2000 U qd  Pt unsure of last Tdap and wants one today  Pt also going to schedule colo  Has appt with GI in Sept 2022  The following portions of the patient's history were reviewed and updated as appropriate:   Past Medical History:  He has a past medical history of Disease of thyroid gland, Gout, Hyperlipidemia, Hypothyroidism, Neuropathy, Osteoarthritis, Plantar fasciitis of right foot, and Schwannoma  ,  _______________________________________________________________________  Medical Problems:  does not have any pertinent problems on file ,  _______________________________________________________________________  Past Surgical History:   has a past surgical history that includes Tonsillectomy  ,  _______________________________________________________________________  Family History:  family history includes Cancer in his family; Diabetes in his family, mother, paternal grandmother, and sister; Emphysema in his mother; Gout in his family; Heart disease in his family, maternal grandfather, and sister; Hypertension in his family; Lung cancer in his father; No Known Problems in his son ,  _______________________________________________________________________  Social History:   reports that he has never smoked  He has never used smokeless tobacco  He reports current alcohol use   He reports that he does not use drugs  ,  _______________________________________________________________________  Allergies:  is allergic to penicillins     _______________________________________________________________________  Current Outpatient Medications   Medication Sig Dispense Refill    atorvastatin (LIPITOR) 10 mg tablet Take by mouth      Cholecalciferol 50 MCG (2000 UT) CAPS Take 1 capsule by mouth daily      febuxostat (ULORIC) 80 MG TABS TAKE 1 TABLET DAILY 90 tablet 3    gabapentin (NEURONTIN) 300 mg capsule Take 1 capsule by mouth 2 (two) times a day      levothyroxine 100 mcg tablet Take 100 mcg by mouth daily      meloxicam (MOBIC) 15 mg tablet Take 15 mg by mouth daily       No current facility-administered medications for this visit      _______________________________________________________________________  Review of Systems   Constitutional: Negative for fatigue and unexpected weight change  Respiratory: Negative for cough and shortness of breath  Cardiovascular: Negative for chest pain  Gastrointestinal: Negative for abdominal pain, constipation, diarrhea and vomiting  Musculoskeletal: Positive for back pain  Negative for arthralgias  Neurological: Positive for numbness  Negative for dizziness and headaches  Psychiatric/Behavioral: Negative for dysphoric mood  The patient is not nervous/anxious  Objective:  Vitals:    08/26/22 0748 08/26/22 0817   BP: 140/80 124/82   BP Location: Left arm Left arm   Patient Position: Sitting Sitting   Cuff Size: Standard Standard   Pulse: 94    Resp: 16    Temp: 98 6 °F (37 °C)    TempSrc: Temporal    SpO2: 98%    Weight: 85 3 kg (188 lb)    Height: 5' 9 25" (1 759 m)      Body mass index is 27 56 kg/m²  Physical Exam  Vitals and nursing note reviewed  Constitutional:       Appearance: Normal appearance  He is well-developed and normal weight  Neck:      Thyroid: No thyromegaly     Cardiovascular:      Rate and Rhythm: Normal rate and regular rhythm  Heart sounds: Normal heart sounds  No murmur heard  Pulmonary:      Effort: Pulmonary effort is normal  No respiratory distress  Breath sounds: Normal breath sounds  No wheezing  Musculoskeletal:      Cervical back: Normal range of motion and neck supple  Right lower leg: No edema  Left lower leg: No edema  Lymphadenopathy:      Cervical: No cervical adenopathy  Neurological:      Mental Status: He is alert and oriented to person, place, and time  Cranial Nerves: No cranial nerve deficit  Psychiatric:         Mood and Affect: Mood normal          Behavior: Behavior normal          Thought Content:  Thought content normal          Judgment: Judgment normal

## 2022-08-26 NOTE — PATIENT INSTRUCTIONS
Lower Back Exercises   WHAT YOU NEED TO KNOW:   Lower back exercises help heal and strengthen your back muscles to prevent another injury  Ask your healthcare provider if you need to see a physical therapist for more advanced exercises  DISCHARGE INSTRUCTIONS:   Return to the emergency department if:   You have severe pain that prevents you from moving  Contact your healthcare provider if:   Your pain becomes worse  You have new pain  You have questions or concerns about your condition or care  Do lower back exercises safely:   Do the exercises on a mat or firm surface  (not on a bed) to support your spine and prevent low back pain  Move slowly and smoothly  Avoid fast or jerky motions  Breathe normally  Do not hold your breath  Stop if you feel pain  It is normal to feel some discomfort at first  Regular exercise will help decrease your discomfort over time  Lower back exercises: Your healthcare provider may recommend that you do back exercises 10 to 30 minutes each day  He may also recommend that you do exercises 1 to 3 times each day  Ask your healthcare provider which exercises are best for you and how often to do them  Ankle pumps:  Lie on your back  Move your foot up (with your toes pointing toward your head)  Then, move your foot down (with your toes pointing away from you)  Repeat this exercise 10 times on each side  Heel slides:  Lie on your back  Slowly bend one leg and then straighten it  Next, bend the other leg and then straighten it  Repeat 10 times on each side  Pelvic tilt:  Lie on your back with your knees bent and feet flat on the floor  Place your arms in a relaxed position beside your body  Tighten the muscles of your abdomen and flatten your back against the floor  Hold for 5 seconds  Repeat 5 times  Back stretch:  Lie on your back with your hands behind your head  Bend your knees and turn the lower half of your body to one side   Hold this position for 10 seconds  Repeat 3 times on each side  Straight leg raises:  Lie on your back with one leg straight  Bend the other knee  Tighten your abdomen and then slowly lift the straight leg up about 6 to 12 inches off the floor  Hold for 1 to 5 seconds  Lower your leg slowly  Repeat 10 times on each leg  Knee-to-chest:  Lie on your back with your knees bent and feet flat on the floor  Pull one of your knees toward your chest and hold it there for 5 seconds  Return your leg to the starting position  Lift the other knee toward your chest and hold for 5 seconds  Do this 5 times on each side  Cat and camel:  Place your hands and knees on the floor  Arch your back upward toward the ceiling and lower your head  Round out your spine as much as you can  Hold for 5 seconds  Lift your head upward and push your chest downward toward the floor  Hold for 5 seconds  Do 3 sets or as directed  Wall squats:  Stand with your back against a wall  Tighten the muscles of your abdomen  Slowly lower your body until your knees are bent at a 45 degree angle  Hold this position for 5 seconds  Slowly move back up to a standing position  Repeat 10 times  Curl up:  Lie on your back with your knees bent and feet flat on the floor  Place your hands, palms down, underneath the curve in your lower back  Next, with your elbows on the floor, lift your shoulders and chest 2 to 3 inches  Keep your head in line with your shoulders  Hold this position for 5 seconds  When you can do this exercise without pain for 10 to 15 seconds, you may add a rotation  While your shoulders and chest are lifted off the ground, turn slightly to the left and hold  Repeat on the other side  Bird dog:  Place your hands and knees on the floor  Keep your wrists directly below your shoulders and your knees directly below your hips  Pull your belly button in toward your spine  Do not flatten or arch your back   Tighten your abdominal muscles  Raise one arm straight out so that it is aligned with your head  Next, raise the leg opposite your arm  Hold this position for 15 seconds  Lower your arm and leg slowly and change sides  Do 5 sets  © Copyright [employer] 2022 Information is for End User's use only and may not be sold, redistributed or otherwise used for commercial purposes  All illustrations and images included in CareNotes® are the copyrighted property of [employer]  or [facility] Kayla Moura   The above information is an  only  It is not intended as medical advice for individual conditions or treatments  Talk to your doctor, nurse or pharmacist before following any medical regimen to see if it is safe and effective for you

## 2022-08-26 NOTE — ASSESSMENT & PLAN NOTE
LDL 72 in July 2022  Continue atorvastatin 10 mg qhs  Advised pt to follow a low cholesterol diet and to exercise on a regular basis

## 2022-09-15 ENCOUNTER — OFFICE VISIT (OUTPATIENT)
Dept: FAMILY MEDICINE CLINIC | Facility: CLINIC | Age: 62
End: 2022-09-15
Payer: COMMERCIAL

## 2022-09-15 VITALS
HEIGHT: 69 IN | BODY MASS INDEX: 27.7 KG/M2 | DIASTOLIC BLOOD PRESSURE: 84 MMHG | SYSTOLIC BLOOD PRESSURE: 130 MMHG | HEART RATE: 104 BPM | OXYGEN SATURATION: 98 % | TEMPERATURE: 97.3 F | WEIGHT: 187 LBS

## 2022-09-15 DIAGNOSIS — F41.9 ANXIETY DISORDER, UNSPECIFIED TYPE: Primary | ICD-10-CM

## 2022-09-15 PROCEDURE — 99213 OFFICE O/P EST LOW 20 MIN: CPT

## 2022-09-15 RX ORDER — CITALOPRAM 10 MG/1
10 TABLET ORAL DAILY
Qty: 30 TABLET | Refills: 5 | Status: SHIPPED | OUTPATIENT
Start: 2022-09-15 | End: 2022-10-13

## 2022-09-15 RX ORDER — ALPRAZOLAM 0.25 MG/1
0.25 TABLET ORAL
Qty: 14 TABLET | Refills: 0 | Status: SHIPPED | OUTPATIENT
Start: 2022-09-15 | End: 2022-10-13

## 2022-09-15 NOTE — PATIENT INSTRUCTIONS
Anxiety   AMBULATORY CARE:   Anxiety  is a condition that causes you to feel extremely worried or nervous  The feelings are so strong that they can cause problems with your daily activities or sleep  Anxiety may be triggered by something you fear, or it may happen without a cause  Family or work stress, smoking, caffeine, and alcohol can increase your risk for anxiety  Certain medicines or health conditions can also increase your risk  Anxiety can become a long-term condition if it is not managed or treated  Common signs and symptoms that may occur with anxiety:   Fatigue or muscle tightness    Shaking, restlessness, or irritability    Problems focusing    Trouble sleeping    Feeling jumpy, easily startled, or dizzy    Rapid heartbeat or shortness of breath    Call your local emergency number (911 in the [address]) if:   You have chest pain, tightness, or heaviness that may spread to your shoulders, arms, jaw, neck, or back  You feel like hurting yourself or someone else  Call your doctor if:   Your symptoms get worse or do not get better with treatment  Your anxiety keeps you from doing your regular daily activities  You have new symptoms since your last visit  You have questions or concerns about your condition or care  Treatment for anxiety  may include medicines to help you feel calm and relaxed, and decrease your symptoms  Medicines are usually given together with therapy or other treatments  Manage anxiety:   Talk to someone about your anxiety  Your healthcare provider may suggest counseling  Cognitive behavioral therapy can help you understand and change how you react to events that trigger your symptoms  You might feel more comfortable talking with a friend or family member about your anxiety  Choose someone you know will be supportive and encouraging  Find ways to relax  Activities such as exercise, meditation, or listening to music can help you relax   Spend time with friends, or do things you enjoy  Practice deep breathing  Deep breathing can help you relax when you feel anxious  Focus on taking slow, deep breaths several times a day, or during an anxiety attack  Breathe in through your nose and out through your mouth  Create a regular sleep routine  Regular sleep can help you feel calmer during the day  Go to sleep and wake up at the same times every day  Do not watch television or use the computer right before bed  Your room should be comfortable, dark, and quiet  Eat a variety of healthy foods  Healthy foods include fruits, vegetables, low-fat dairy products, lean meats, fish, whole-grain breads, and cooked beans  Healthy foods can help you feel less anxious and have more energy  Exercise regularly  Exercise can increase your energy level  Exercise may also lift your mood and help you sleep better  Your healthcare provider can help you create an exercise plan  Do not smoke  Nicotine and other chemicals in cigarettes and cigars can increase anxiety  Ask your healthcare provider for information if you currently smoke and need help to quit  E-cigarettes or smokeless tobacco still contain nicotine  Talk to your healthcare provider before you use these products  Do not have caffeine  Caffeine can make your symptoms worse  Do not have foods or drinks that are meant to increase your energy level  Limit or do not drink alcohol  Ask your healthcare provider if alcohol is safe for you  You may not be able to drink alcohol if you take certain anxiety or depression medicines  Limit alcohol to 1 drink per day if you are a woman  Limit alcohol to 2 drinks per day if you are a man  A drink of alcohol is 12 ounces of beer, 5 ounces of wine, or 1½ ounces of liquor  Do not use drugs  Drugs can make your anxiety worse  It can also make anxiety hard to manage  Talk to your healthcare provider if you use drugs and want help to quit      Follow up with your doctor within 2 weeks or as directed:  Write down your questions so you remember to ask them during your visits  © Copyright [employer] 2022 Information is for End User's use only and may not be sold, redistributed or otherwise used for commercial purposes  All illustrations and images included in CareNotes® are the copyrighted property of [employer]  or Christel Chen  The above information is an  only  It is not intended as medical advice for individual conditions or treatments  Talk to your doctor, nurse or pharmacist before following any medical regimen to see if it is safe and effective for you

## 2022-09-15 NOTE — ASSESSMENT & PLAN NOTE
Severe anxiety  Start citalopram as prescribed, short term alprazolam until medication is effective and referral to psychology   F/u 4 weeks

## 2022-09-15 NOTE — PROGRESS NOTES
Name: Lolita Morales      : 1960      MRN: [id number]  Encounter Provider: JACOB Greene  Encounter Date: 9/15/2022   Encounter department: [facility] MEDICINE    Assessment & Plan     1  Anxiety disorder, unspecified type  Assessment & Plan:  Severe anxiety  Start citalopram as prescribed, short term alprazolam until medication is effective and referral to psychology  F/u 4 weeks    Orders:  -     citalopram (CeleXA) 10 mg tablet; Take 1 tablet (10 mg total) by mouth daily  -     Ambulatory Referral to Psychology; Future  -     ALPRAZolam (XANAX) 0 25 mg tablet; Take 1 tablet (0 25 mg total) by mouth daily at bedtime as needed for anxiety for up to 14 days         Subjective      Has had anxiety for the past 2 years that has gotten worse over the last few weeks, recheck /84  JAMES-7 score of 19 indicating severe anxiety  He believes anxiety is driven mostly by stress from work  He has difficulty staying asleep, palpitation, chest tightness and difficulty concentrating  Discussed medication and behavioral therapy including side effect of medications  Review of Systems   Constitutional: Negative for activity change, chills, fatigue and fever  HENT: Negative for congestion, sinus pressure and sneezing  Respiratory: Positive for chest tightness  Negative for cough and shortness of breath  Cardiovascular: Positive for palpitations  Negative for chest pain  Gastrointestinal: Negative for abdominal pain, diarrhea, nausea and vomiting  Genitourinary: Negative for difficulty urinating  Musculoskeletal: Negative for arthralgias, joint swelling and neck pain  Skin: Negative for color change  Allergic/Immunologic: Negative for environmental allergies  Neurological: Negative for dizziness, tremors, syncope, weakness and headaches  Psychiatric/Behavioral: Positive for agitation, decreased concentration and sleep disturbance   Negative for behavioral problems, confusion, dysphoric mood, hallucinations, self-injury and suicidal ideas  The patient is nervous/anxious  The patient is not hyperactive  Current Outpatient Medications on File Prior to Visit   Medication Sig    atorvastatin (LIPITOR) 10 mg tablet Take by mouth    Cholecalciferol 50 MCG (2000 UT) CAPS Take 1 capsule by mouth daily    febuxostat (ULORIC) 80 MG TABS TAKE 1 TABLET DAILY    gabapentin (NEURONTIN) 300 mg capsule Take 1 capsule by mouth 2 (two) times a day    levothyroxine 100 mcg tablet Take 100 mcg by mouth daily    [DISCONTINUED] meloxicam (MOBIC) 15 mg tablet Take 15 mg by mouth daily (Patient not taking: Reported on 9/15/2022)       Objective     /84 (BP Location: Right arm, Patient Position: Sitting)   Pulse 104   Temp (!) 97 3 °F (36 3 °C) (Temporal)   Ht 5' 9 25" (1 759 m)   Wt 84 8 kg (187 lb)   SpO2 98%   BMI 27 42 kg/m²     Physical Exam  Vitals and nursing note reviewed  Constitutional:       General: He is not in acute distress  Appearance: Normal appearance  He is normal weight  He is not ill-appearing or toxic-appearing  HENT:      Head: Normocephalic and atraumatic  Right Ear: Tympanic membrane, ear canal and external ear normal  There is no impacted cerumen  Left Ear: Tympanic membrane, ear canal and external ear normal  There is no impacted cerumen  Nose: Nose normal  No congestion or rhinorrhea  Mouth/Throat:      Mouth: Mucous membranes are moist       Pharynx: No oropharyngeal exudate or posterior oropharyngeal erythema  Eyes:      General:         Right eye: No discharge  Left eye: No discharge  Extraocular Movements: Extraocular movements intact  Conjunctiva/sclera: Conjunctivae normal       Pupils: Pupils are equal, round, and reactive to light  Neck:      Vascular: No carotid bruit  Cardiovascular:      Rate and Rhythm: Normal rate and regular rhythm  Pulses: Normal pulses        Heart sounds: Normal heart sounds  No murmur heard  Pulmonary:      Effort: Pulmonary effort is normal  No respiratory distress  Breath sounds: Normal breath sounds  No wheezing  Chest:      Chest wall: No tenderness  Abdominal:      General: Bowel sounds are normal  There is no distension  Palpations: Abdomen is soft  There is no mass  Tenderness: There is no abdominal tenderness  There is no right CVA tenderness or left CVA tenderness  Musculoskeletal:         General: No swelling or tenderness  Normal range of motion  Cervical back: Normal range of motion  No rigidity or tenderness  Right lower leg: No edema  Left lower leg: No edema  Lymphadenopathy:      Cervical: No cervical adenopathy  Skin:     General: Skin is warm  Capillary Refill: Capillary refill takes less than 2 seconds  Coloration: Skin is not jaundiced  Findings: No bruising, erythema or rash  Neurological:      General: No focal deficit present  Mental Status: He is alert and oriented to person, place, and time  Cranial Nerves: No cranial nerve deficit  Sensory: No sensory deficit  Coordination: Coordination normal       Gait: Gait normal       Deep Tendon Reflexes: Reflexes normal    Psychiatric:         Attention and Perception: Attention and perception normal  He does not perceive visual hallucinations  Mood and Affect: Affect normal  Mood is anxious  Speech: Speech normal          Behavior: Behavior normal  Behavior is cooperative  Thought Content: Thought content normal  Thought content is not delusional  Thought content does not include homicidal or suicidal ideation  Thought content does not include homicidal or suicidal plan           Cognition and Memory: Cognition and memory normal          Judgment: Judgment normal        JACOB Greene

## 2022-09-20 ENCOUNTER — TELEPHONE (OUTPATIENT)
Dept: FAMILY MEDICINE CLINIC | Facility: CLINIC | Age: 62
End: 2022-09-20

## 2022-09-21 ENCOUNTER — TELEPHONE (OUTPATIENT)
Dept: FAMILY MEDICINE CLINIC | Facility: CLINIC | Age: 62
End: 2022-09-21

## 2022-09-21 ENCOUNTER — OFFICE VISIT (OUTPATIENT)
Dept: FAMILY MEDICINE CLINIC | Facility: CLINIC | Age: 62
End: 2022-09-21
Payer: COMMERCIAL

## 2022-09-21 VITALS
SYSTOLIC BLOOD PRESSURE: 130 MMHG | OXYGEN SATURATION: 98 % | HEIGHT: 69 IN | HEART RATE: 85 BPM | DIASTOLIC BLOOD PRESSURE: 78 MMHG | WEIGHT: 187 LBS | BODY MASS INDEX: 27.7 KG/M2 | TEMPERATURE: 97.2 F

## 2022-09-21 DIAGNOSIS — F41.9 ANXIETY DISORDER, UNSPECIFIED TYPE: Primary | ICD-10-CM

## 2022-09-21 PROCEDURE — 3725F SCREEN DEPRESSION PERFORMED: CPT

## 2022-09-21 PROCEDURE — 99213 OFFICE O/P EST LOW 20 MIN: CPT

## 2022-09-21 NOTE — ASSESSMENT & PLAN NOTE
Feels like medication is helping  Has appointment at [facility] therapy today  Continue medication therapy and f/u as scheduled in 3 weeks  [place] paperwork completed

## 2022-09-21 NOTE — PROGRESS NOTES
Name: Ba Zheng      : 1960      MRN: [id number]  Encounter Provider: JACOB Hall  Encounter Date: 2022   Encounter department: [facility] MEDICINE    Assessment & Plan     1  Anxiety disorder, unspecified type  Assessment & Plan:  Feels like medication is helping  Has appointment at [facility] therapy today  Continue medication therapy and f/u as scheduled in 3 weeks  FMLA paperwork completed  Subjective      Presents for f/u, states that he is starting to feel somewhat better however does not feel he is able to return to work just yet  He has appointment with therapist today at [facility] therapy and would like FMLA paperwork completed  Review of Systems   Constitutional: Negative for fatigue and fever  HENT: Negative for congestion, postnasal drip and rhinorrhea  Respiratory: Positive for chest tightness  Cardiovascular: Positive for palpitations  Psychiatric/Behavioral: Negative for agitation, decreased concentration and sleep disturbance  The patient is nervous/anxious          Current Outpatient Medications on File Prior to Visit   Medication Sig    ALPRAZolam (XANAX) 0 25 mg tablet Take 1 tablet (0 25 mg total) by mouth daily at bedtime as needed for anxiety for up to 14 days    atorvastatin (LIPITOR) 10 mg tablet Take by mouth    Cholecalciferol 50 MCG (2000 UT) CAPS Take 1 capsule by mouth daily    citalopram (CeleXA) 10 mg tablet Take 1 tablet (10 mg total) by mouth daily    febuxostat (ULORIC) 80 MG TABS TAKE 1 TABLET DAILY    gabapentin (NEURONTIN) 300 mg capsule Take 1 capsule by mouth 2 (two) times a day    levothyroxine 100 mcg tablet Take 100 mcg by mouth daily       Objective     /78 (BP Location: Right arm, Patient Position: Sitting, Cuff Size: Standard)   Pulse 85   Temp (!) 97 2 °F (36 2 °C) (Temporal)   Ht 5' 9 25" (1 759 m)   Wt 84 8 kg (187 lb)   SpO2 98%   BMI 27 42 kg/m²     Physical Exam  Vitals and nursing note reviewed  Constitutional:       General: He is not in acute distress  Appearance: Normal appearance  He is normal weight  He is not ill-appearing, toxic-appearing or diaphoretic  HENT:      Head: Normocephalic and atraumatic  Nose: Nose normal    Cardiovascular:      Rate and Rhythm: Normal rate and regular rhythm  Pulses: Normal pulses  Heart sounds: Normal heart sounds  Pulmonary:      Effort: Pulmonary effort is normal       Breath sounds: Normal breath sounds  Skin:     Capillary Refill: Capillary refill takes less than 2 seconds  Neurological:      General: No focal deficit present  Mental Status: He is alert and oriented to person, place, and time  Sensory: No sensory deficit  Psychiatric:         Attention and Perception: Attention and perception normal          Mood and Affect: Mood is anxious  Affect is flat  Speech: Speech normal          Behavior: Behavior normal  Behavior is cooperative  Thought Content: Thought content normal  Thought content does not include homicidal or suicidal ideation  Thought content does not include homicidal or suicidal plan           Cognition and Memory: Cognition normal          Judgment: Judgment normal        JACOB Greene

## 2022-09-21 NOTE — TELEPHONE ENCOUNTER
Isidro Fierro was seen this am and he said his employer is waiting on [facility] papers  He's asking us to please fax it to his work    FAX: [phone]

## 2022-10-11 PROBLEM — K63.5 COLON POLYP: Status: ACTIVE | Noted: 2022-10-11

## 2022-10-13 ENCOUNTER — OFFICE VISIT (OUTPATIENT)
Dept: FAMILY MEDICINE CLINIC | Facility: CLINIC | Age: 62
End: 2022-10-13
Payer: COMMERCIAL

## 2022-10-13 VITALS
HEART RATE: 85 BPM | BODY MASS INDEX: 27.25 KG/M2 | OXYGEN SATURATION: 99 % | WEIGHT: 184 LBS | TEMPERATURE: 97.2 F | SYSTOLIC BLOOD PRESSURE: 120 MMHG | HEIGHT: 69 IN | DIASTOLIC BLOOD PRESSURE: 70 MMHG | RESPIRATION RATE: 16 BRPM

## 2022-10-13 DIAGNOSIS — K63.5 POLYP OF COLON, UNSPECIFIED PART OF COLON, UNSPECIFIED TYPE: ICD-10-CM

## 2022-10-13 DIAGNOSIS — E55.9 VITAMIN D DEFICIENCY: ICD-10-CM

## 2022-10-13 DIAGNOSIS — E03.9 ACQUIRED HYPOTHYROIDISM: ICD-10-CM

## 2022-10-13 DIAGNOSIS — G62.9 SMALL FIBER NEUROPATHY: ICD-10-CM

## 2022-10-13 DIAGNOSIS — E78.2 MIXED HYPERLIPIDEMIA: ICD-10-CM

## 2022-10-13 DIAGNOSIS — F41.9 ANXIETY DISORDER, UNSPECIFIED TYPE: Primary | ICD-10-CM

## 2022-10-13 DIAGNOSIS — M47.816 LUMBAR SPONDYLOSIS: ICD-10-CM

## 2022-10-13 DIAGNOSIS — M1A.0710 IDIOPATHIC CHRONIC GOUT OF RIGHT FOOT WITHOUT TOPHUS: ICD-10-CM

## 2022-10-13 PROCEDURE — 99214 OFFICE O/P EST MOD 30 MIN: CPT | Performed by: FAMILY MEDICINE

## 2022-10-13 RX ORDER — CITALOPRAM 20 MG/1
20 TABLET ORAL DAILY
Qty: 30 TABLET | Refills: 5 | Status: SHIPPED | OUTPATIENT
Start: 2022-10-13

## 2022-10-13 NOTE — ASSESSMENT & PLAN NOTE
Pt was doing better but sxs increased this week  Will increase citalopram to 20 mg qd  Pt seeing therapist weekly  Not ready to return to work yet  Will recheck in 3 weeks

## 2022-10-13 NOTE — PROGRESS NOTES
Depression Screening and Follow-up Plan: Patient was screened for depression during today's encounter  They screened negative with a PHQ-2 score of 1  Assessment/Plan:         Problem List Items Addressed This Visit        Digestive    Colon polyp     Had colonoscopy in Oct 2022 by Dr Debbie Avalos and pt had 2 polyps  Next one is due in Oct 2027  Endocrine    Acquired hypothyroidism     TSH 2 98 in July 2022  Continue levothyroxine 100 mcg qd  Pt sees Endocrinology  Nervous and Auditory    Small fiber neuropathy     Has been stable on gabapentin 300 bid  Pt has seen Neurology  Musculoskeletal and Integument    Lumbar spondylosis     Pt gets pain at times and takes meloxicam 15 mg qd as needed  Idiopathic chronic gout of right foot without tophus     No recent episodes  Uric acid 3 3 in July 2022  Continue uloric 80 mg qd  Sees Rheumatology and his next appointment is in Jan 2023  Other    Vitamin D deficiency     Level 42 in Sept 2022  Continue 2000 U qd  Mixed hyperlipidemia     LDL 72 in July 2022  Continue atorvastatin 10 mg qhs  Advised pt to follow a low cholesterol diet and to exercise on a regular basis  Anxiety disorder - Primary     Pt was doing better but sxs increased this week  Will increase citalopram to 20 mg qd  Pt seeing therapist weekly  Not ready to return to work yet  Will recheck in 3 weeks  Relevant Medications    citalopram (CeleXA) 20 mg tablet            Subjective:      Patient ID: Maryuri Spivey is a 65 y o  male  Pt here for f/u Anxiety, Hypothyroidism, HL, Vit D def, Colon Polyps  Pt has had increased symptoms this week  Was feeling better  Seeing therapist q wk  Had colonoscopy and had 2 polyps  Gets chest pain when has increased stress and gets palpitations  Sleeping ok  Pt has been out of work and not ready to go back yet  Anxiety  Symptoms include nervous/anxious behavior   Patient reports no chest pain, decreased concentration, dizziness, nausea, palpitations, shortness of breath or suicidal ideas  The following portions of the patient's history were reviewed and updated as appropriate:   Past Medical History:  He has a past medical history of Disease of thyroid gland, Gout, Hyperlipidemia, Hypothyroidism, Neuropathy, Osteoarthritis, Plantar fasciitis of right foot, and Schwannoma  ,  _______________________________________________________________________  Medical Problems:  does not have any pertinent problems on file ,  _______________________________________________________________________  Past Surgical History:   has a past surgical history that includes Tonsillectomy  ,  _______________________________________________________________________  Family History:  family history includes Cancer in his family; Diabetes in his family, mother, paternal grandmother, and sister; Emphysema in his mother; Gout in his family; Heart disease in his family, maternal grandfather, and sister; Hypertension in his family; Lung cancer in his father; No Known Problems in his son ,  _______________________________________________________________________  Social History:   reports that he has never smoked  He has never used smokeless tobacco  He reports current alcohol use  He reports that he does not use drugs  ,  _______________________________________________________________________  Allergies:  is allergic to penicillin v and penicillins     _______________________________________________________________________  Current Outpatient Medications   Medication Sig Dispense Refill   • ALPRAZolam (XANAX) 0 25 mg tablet Take 1 tablet (0 25 mg total) by mouth daily at bedtime as needed for anxiety for up to 14 days 14 tablet 0   • atorvastatin (LIPITOR) 10 mg tablet Take by mouth     • Cholecalciferol 50 MCG (2000 UT) CAPS Take 1 capsule by mouth daily     • citalopram (CeleXA) 20 mg tablet Take 1 tablet (20 mg total) by mouth daily 30 tablet 5   • febuxostat (ULORIC) 80 MG TABS TAKE 1 TABLET DAILY 90 tablet 3   • gabapentin (NEURONTIN) 300 mg capsule Take 1 capsule by mouth 2 (two) times a day     • levothyroxine 100 mcg tablet Take 100 mcg by mouth daily       No current facility-administered medications for this visit      _______________________________________________________________________  Review of Systems   Constitutional: Negative for activity change, appetite change and fatigue  Respiratory: Negative for chest tightness and shortness of breath  Cardiovascular: Negative for chest pain and palpitations  Gastrointestinal: Negative for abdominal pain, diarrhea, nausea and vomiting  Neurological: Negative for dizziness, tremors, light-headedness and headaches  Psychiatric/Behavioral: Negative for agitation, decreased concentration, dysphoric mood, self-injury, sleep disturbance and suicidal ideas  The patient is nervous/anxious  Objective:  Vitals:    10/13/22 0803   BP: 120/70   BP Location: Left arm   Patient Position: Sitting   Cuff Size: Standard   Pulse: 85   Resp: 16   Temp: (!) 97 2 °F (36 2 °C)   TempSrc: Temporal   SpO2: 99%   Weight: 83 5 kg (184 lb)   Height: 5' 9" (1 753 m)     Body mass index is 27 17 kg/m²  Physical Exam  Vitals and nursing note reviewed  Constitutional:       Appearance: Normal appearance  He is well-developed and normal weight  Neck:      Thyroid: No thyromegaly  Cardiovascular:      Rate and Rhythm: Normal rate and regular rhythm  Heart sounds: Normal heart sounds  No murmur heard  Pulmonary:      Effort: Pulmonary effort is normal  No respiratory distress  Breath sounds: Normal breath sounds  No wheezing  Musculoskeletal:      Cervical back: Normal range of motion and neck supple  Right lower leg: No edema  Left lower leg: No edema  Lymphadenopathy:      Cervical: No cervical adenopathy     Neurological:      Mental Status: He is alert and oriented to person, place, and time  Cranial Nerves: No cranial nerve deficit  Psychiatric:         Mood and Affect: Mood normal          Behavior: Behavior normal          Thought Content:  Thought content normal          Judgment: Judgment normal

## 2022-11-03 ENCOUNTER — OFFICE VISIT (OUTPATIENT)
Dept: FAMILY MEDICINE CLINIC | Facility: CLINIC | Age: 62
End: 2022-11-03

## 2022-11-03 VITALS
WEIGHT: 183 LBS | BODY MASS INDEX: 27.11 KG/M2 | TEMPERATURE: 97.5 F | HEART RATE: 81 BPM | RESPIRATION RATE: 16 BRPM | DIASTOLIC BLOOD PRESSURE: 66 MMHG | OXYGEN SATURATION: 96 % | SYSTOLIC BLOOD PRESSURE: 110 MMHG | HEIGHT: 69 IN

## 2022-11-03 DIAGNOSIS — F41.9 ANXIETY DISORDER, UNSPECIFIED TYPE: Primary | ICD-10-CM

## 2022-11-03 DIAGNOSIS — E03.9 ACQUIRED HYPOTHYROIDISM: ICD-10-CM

## 2022-11-03 RX ORDER — LEVOTHYROXINE SODIUM 0.1 MG/1
100 TABLET ORAL DAILY
Qty: 90 TABLET | Refills: 3 | Status: SHIPPED | OUTPATIENT
Start: 2022-11-03

## 2022-11-03 NOTE — PROGRESS NOTES
Depression Screening and Follow-up Plan: Patient was screened for depression during today's encounter  They screened negative with a PHQ-2 score of 0  Assessment/Plan:         Problem List Items Addressed This Visit        Endocrine    Acquired hypothyroidism    Relevant Medications    levothyroxine 100 mcg tablet       Other    Anxiety disorder - Primary     Pt doing much better on citalopram 20 mg qd  Pt more relaxed and redy to RTW on 11/7/22  Note given for work  Subjective:      Patient ID: Rabia Gold is a 65 y o  male  Pt here for f/u anxiety  Has been doing better on citalopram 20 mg qd  Feels more relaxed and not on edge  Sleeping ok  Sees therapist every other week now  No new c/o  Ready to try to RTW on 11/7/22  The following portions of the patient's history were reviewed and updated as appropriate:   Past Medical History:  He has a past medical history of Disease of thyroid gland, Gout, Hyperlipidemia, Hypothyroidism, Neuropathy, Osteoarthritis, Plantar fasciitis of right foot, and Schwannoma  ,  _______________________________________________________________________  Medical Problems:  does not have any pertinent problems on file ,  _______________________________________________________________________  Past Surgical History:   has a past surgical history that includes Tonsillectomy  ,  _______________________________________________________________________  Family History:  family history includes Cancer in his family; Diabetes in his family, mother, paternal grandmother, and sister; Emphysema in his mother; Gout in his family; Heart disease in his family, maternal grandfather, and sister; Hypertension in his family; Lung cancer in his father; No Known Problems in his son ,  _______________________________________________________________________  Social History:   reports that he has never smoked  He has never used smokeless tobacco  He reports current alcohol use   He reports that he does not use drugs  ,  _______________________________________________________________________  Allergies:  is allergic to penicillin v and penicillins     _______________________________________________________________________  Current Outpatient Medications   Medication Sig Dispense Refill   • ALPRAZolam (XANAX) 0 25 mg tablet Take 1 tablet (0 25 mg total) by mouth daily at bedtime as needed for anxiety for up to 14 days 14 tablet 0   • atorvastatin (LIPITOR) 10 mg tablet Take by mouth     • Cholecalciferol 50 MCG (2000 UT) CAPS Take 1 capsule by mouth daily     • citalopram (CeleXA) 20 mg tablet Take 1 tablet (20 mg total) by mouth daily 30 tablet 5   • febuxostat (ULORIC) 80 MG TABS Take 1 tablet (80 mg total) by mouth daily 90 tablet 1   • gabapentin (NEURONTIN) 300 mg capsule Take 1 capsule by mouth 2 (two) times a day     • levothyroxine 100 mcg tablet Take 1 tablet (100 mcg total) by mouth daily 90 tablet 3     No current facility-administered medications for this visit      _______________________________________________________________________  Review of Systems   Constitutional: Negative for activity change, appetite change and fatigue  Respiratory: Negative for chest tightness and shortness of breath  Cardiovascular: Negative for chest pain and palpitations  Gastrointestinal: Negative for abdominal pain, diarrhea, nausea and vomiting  Neurological: Negative for dizziness, tremors, light-headedness and headaches  Psychiatric/Behavioral: Negative for agitation, decreased concentration, dysphoric mood, self-injury, sleep disturbance and suicidal ideas  The patient is not nervous/anxious            Objective:  Vitals:    11/03/22 1410   BP: 110/66   BP Location: Left arm   Patient Position: Sitting   Cuff Size: Standard   Pulse: 81   Resp: 16   Temp: 97 5 °F (36 4 °C)   TempSrc: Temporal   SpO2: 96%   Weight: 83 kg (183 lb)   Height: 5' 9" (1 753 m)     Body mass index is 27 02 kg/m²  Physical Exam  Vitals and nursing note reviewed  Constitutional:       Appearance: Normal appearance  He is well-developed and normal weight  Neck:      Thyroid: No thyromegaly  Cardiovascular:      Rate and Rhythm: Normal rate and regular rhythm  Heart sounds: Normal heart sounds  No murmur heard  Pulmonary:      Effort: Pulmonary effort is normal  No respiratory distress  Breath sounds: Normal breath sounds  No wheezing  Musculoskeletal:      Cervical back: Normal range of motion and neck supple  Right lower leg: No edema  Left lower leg: No edema  Lymphadenopathy:      Cervical: No cervical adenopathy  Neurological:      Mental Status: He is alert and oriented to person, place, and time  Cranial Nerves: No cranial nerve deficit  Psychiatric:         Mood and Affect: Mood normal          Behavior: Behavior normal          Thought Content:  Thought content normal          Judgment: Judgment normal

## 2022-11-03 NOTE — LETTER
November 3, 2022     Patient: Michelle Sam  YOB: 1960  Date of Visit: 11/3/2022      To Whom it May Concern:    Michelle Sam is under my professional care  Desireekishor Mary Grace was seen in my office on 11/3/2022  Hakeem Brody is doing better and may return to work on 11/7/22  If you have any questions or concerns, please don't hesitate to call           Sincerely,          Dl Soriano MD        CC: No Recipients

## 2022-11-03 NOTE — ASSESSMENT & PLAN NOTE
Pt doing much better on citalopram 20 mg qd  Pt more relaxed and redy to RTW on 11/7/22  Note given for work

## 2022-11-18 ENCOUNTER — OFFICE VISIT (OUTPATIENT)
Dept: FAMILY MEDICINE CLINIC | Facility: CLINIC | Age: 62
End: 2022-11-18

## 2022-11-18 VITALS
DIASTOLIC BLOOD PRESSURE: 72 MMHG | HEIGHT: 69 IN | HEART RATE: 97 BPM | RESPIRATION RATE: 16 BRPM | BODY MASS INDEX: 27.25 KG/M2 | WEIGHT: 184 LBS | OXYGEN SATURATION: 98 % | SYSTOLIC BLOOD PRESSURE: 118 MMHG | TEMPERATURE: 97.3 F

## 2022-11-18 DIAGNOSIS — Z00.00 ENCOUNTER FOR ANNUAL PHYSICAL EXAM: Primary | ICD-10-CM

## 2022-11-18 DIAGNOSIS — Z23 ENCOUNTER FOR IMMUNIZATION: ICD-10-CM

## 2022-11-18 NOTE — PATIENT INSTRUCTIONS
Wellness Visit for Adults   AMBULATORY CARE:   A wellness visit  is when you see your healthcare provider to get screened for health problems  Your healthcare provider will also give you advice on how to stay healthy  Write down your questions so you remember to ask them  Ask your healthcare provider how often you should have a wellness visit  What happens at a wellness visit:  Your healthcare provider will ask about your health, and your family history of health problems  This includes high blood pressure, heart disease, and cancer  He or she will ask if you have symptoms that concern you, if you smoke, and about your mood  You may also be asked about your intake of medicines, supplements, food, and alcohol  Any of the following may be done: Your weight  will be checked  Your height may also be checked so your body mass index (BMI) can be calculated  Your BMI shows if you are at a healthy weight  Your blood pressure  and heart rate will be checked  Your temperature may also be checked  Blood and urine tests  may be done  Blood tests may be done to check your cholesterol levels  Abnormal cholesterol levels increase your risk for heart disease and stroke  You may also need a blood or urine test to check for diabetes if you are at increased risk  Urine tests may be done to look for signs of an infection or kidney disease  A physical exam  includes checking your heartbeat and lungs with a stethoscope  Your healthcare provider may also check your skin to look for sun damage  Screening tests  may be recommended  A screening test is done to check for diseases that may not cause symptoms  The screening tests you may need depend on your age, gender, family history, and lifestyle habits  For example, colorectal screening may be recommended if you are 48years old or older  Screening tests you need if you are a woman:   A Pap smear  is used to screen for cervical cancer   Pap smears are usually done every 3 to 5 years depending on your age  You may need them more often if you have had abnormal Pap smear test results in the past  Ask your healthcare provider how often you should have a Pap smear  A mammogram  is an x-ray of your breasts to screen for breast cancer  Experts recommend mammograms every 2 years starting at age 48 years  You may need a mammogram at age 52 years or younger if you have an increased risk for breast cancer  Talk to your healthcare provider about when you should start having mammograms and how often you need them  Vaccines you may need:   Get an influenza vaccine  every year  The influenza vaccine protects you from the flu  Several types of viruses cause the flu  The viruses change over time, so new vaccines are made each year  Get a tetanus-diphtheria (Td) booster vaccine  every 10 years  This vaccine protects you against tetanus and diphtheria  Tetanus is a severe infection that may cause painful muscle spasms and lockjaw  Diphtheria is a severe bacterial infection that causes a thick covering in the back of your mouth and throat  Get a human papillomavirus (HPV) vaccine  if you are female and aged 23 to 32 or male 23 to 24 and never received it  This vaccine protects you from HPV infection  HPV is the most common infection spread by sexual contact  HPV may also cause vaginal, penile, and anal cancers  Get a pneumococcal vaccine  if you are aged 72 years or older  The pneumococcal vaccine is an injection given to protect you from pneumococcal disease  Pneumococcal disease is an infection caused by pneumococcal bacteria  The infection may cause pneumonia, meningitis, or an ear infection  Get a shingles vaccine  if you are 60 or older, even if you have had shingles before  The shingles vaccine is an injection to protect you from the varicella-zoster virus  This is the same virus that causes chickenpox   Shingles is a painful rash that develops in people who had chickenpox or have been exposed to the virus  How to eat healthy:  My Plate is a model for planning healthy meals  It shows the types and amounts of foods that should go on your plate  Fruits and vegetables make up about half of your plate, and grains and protein make up the other half  A serving of dairy is included on the side of your plate  The amount of calories and serving sizes you need depends on your age, gender, weight, and height  Examples of healthy foods are listed below:  Eat a variety of vegetables  such as dark green, red, and orange vegetables  You can also include canned vegetables low in sodium (salt) and frozen vegetables without added butter or sauces  Eat a variety of fresh fruits , canned fruit in 100% juice, frozen fruit, and dried fruit  Include whole grains  At least half of the grains you eat should be whole grains  Examples include whole-wheat bread, wheat pasta, brown rice, and whole-grain cereals such as oatmeal     Eat a variety of protein foods such as seafood (fish and shellfish), lean meat, and poultry without skin (turkey and chicken)  Examples of lean meats include pork leg, shoulder, or tenderloin, and beef round, sirloin, tenderloin, and extra lean ground beef  Other protein foods include eggs and egg substitutes, beans, peas, soy products, nuts, and seeds  Choose low-fat dairy products such as skim or 1% milk or low-fat yogurt, cheese, and cottage cheese  Limit unhealthy fats  such as butter, hard margarine, and shortening  Exercise:  Exercise at least 30 minutes per day on most days of the week  Some examples of exercise include walking, biking, dancing, and swimming  You can also fit in more physical activity by taking the stairs instead of the elevator or parking farther away from stores  Include muscle strengthening activities 2 days each week  Regular exercise provides many health benefits   It helps you manage your weight, and decreases your risk for type 2 diabetes, heart disease, stroke, and high blood pressure  Exercise can also help improve your mood  Ask your healthcare provider about the best exercise plan for you  General health and safety guidelines:   Do not smoke  Nicotine and other chemicals in cigarettes and cigars can cause lung damage  Ask your healthcare provider for information if you currently smoke and need help to quit  E-cigarettes or smokeless tobacco still contain nicotine  Talk to your healthcare provider before you use these products  Limit alcohol  A drink of alcohol is 12 ounces of beer, 5 ounces of wine, or 1½ ounces of liquor  Lose weight, if needed  Being overweight increases your risk of certain health conditions  These include heart disease, high blood pressure, type 2 diabetes, and certain types of cancer  Protect your skin  Do not sunbathe or use tanning beds  Use sunscreen with a SPF 15 or higher  Apply sunscreen at least 15 minutes before you go outside  Reapply sunscreen every 2 hours  Wear protective clothing, hats, and sunglasses when you are outside  Drive safely  Always wear your seatbelt  Make sure everyone in your car wears a seatbelt  A seatbelt can save your life if you are in an accident  Do not use your cell phone when you are driving  This could distract you and cause an accident  Pull over if you need to make a call or send a text message  Practice safe sex  Use latex condoms if are sexually active and have more than one partner  Your healthcare provider may recommend screening tests for sexually transmitted infections (STIs)  Wear helmets, lifejackets, and protective gear  Always wear a helmet when you ride a bike or motorcycle, go skiing, or play sports that could cause a head injury  Wear protective equipment when you play sports  Wear a lifejacket when you are on a boat or doing water sports      © Copyright [employer] 2022 Information is for End User's use only and may not be sold, redistributed or otherwise used for commercial purposes  All illustrations and images included in CareNotes® are the copyrighted property of [employer]  or Christel Chen  The above information is an  only  It is not intended as medical advice for individual conditions or treatments  Talk to your doctor, nurse or pharmacist before following any medical regimen to see if it is safe and effective for you

## 2022-11-18 NOTE — ASSESSMENT & PLAN NOTE
CPE done  Last Tdap was in 2022  Had flu shot  Shingrix #2 given  Labs are UTD  Pt had colonoscopy in Oct 2022 by Dr Marianne Cobb and he had 2 polyps  Next one is due in Oct 2027  Pt advised to follow a well balanced, heart healthy diet and to exercise on a regular basis  Not a smoker  BP good

## 2022-11-18 NOTE — PROGRESS NOTES
Depression Screening and Follow-up Plan: Patient was screened for depression during today's encounter  They screened negative with a PHQ-2 score of 0  Assessment/Plan:         Problem List Items Addressed This Visit        Other    Encounter for annual physical exam - Primary     CPE done  Last Tdap was in 2022  Had flu shot  Shingrix #2 given  Labs are UTD  Pt had colonoscopy in Oct 2022 by Dr Pedro Werner and he had 2 polyps  Next one is due in Oct 2027  Pt advised to follow a well balanced, heart healthy diet and to exercise on a regular basis  Not a smoker  BP good  Other Visit Diagnoses     Encounter for immunization        Relevant Orders    Zoster Vaccine Recombinant IM            Subjective:      Patient ID: Tayler Jonas is a 65 y o  male  Pt here for physical  Doing well  No cp/sob  No HA or abd pain  Last Tdap was in 2022 and pt had flu shot  Needs 2nd shindrix  Pt has been walking at times  Tries to eat healthy  Had colo in Oct 2022 and had 2 polyps  Labs are UTD  Not a smoker  Occasional ETOH  The following portions of the patient's history were reviewed and updated as appropriate:   Past Medical History:  He has a past medical history of Anxiety (9/15/2022), Disease of thyroid gland, Gout, Hyperlipidemia, Hypothyroidism, Neuropathy, Osteoarthritis, Plantar fasciitis of right foot, and Schwannoma  ,  _______________________________________________________________________  Medical Problems:  does not have any pertinent problems on file ,  _______________________________________________________________________  Past Surgical History:   has a past surgical history that includes Tonsillectomy  ,  _______________________________________________________________________  Family History:  family history includes Arthritis in his mother; Cancer in his family and father; Diabetes in his family, mother, paternal grandmother, and sister; Emphysema in his mother; Gout in his family;  Heart disease in his family, maternal grandfather, and sister; Hypertension in his family; Lung cancer in his father; No Known Problems in his son ,  _______________________________________________________________________  Social History:   reports that he has never smoked  He has never used smokeless tobacco  He reports current alcohol use  He reports that he does not use drugs  ,  _______________________________________________________________________  Allergies:  is allergic to penicillin v and penicillins     _______________________________________________________________________  Current Outpatient Medications   Medication Sig Dispense Refill   • atorvastatin (LIPITOR) 10 mg tablet Take by mouth     • Cholecalciferol 50 MCG (2000 UT) CAPS Take 1 capsule by mouth daily     • citalopram (CeleXA) 20 mg tablet Take 1 tablet (20 mg total) by mouth daily 30 tablet 5   • febuxostat (ULORIC) 80 MG TABS Take 1 tablet (80 mg total) by mouth daily 90 tablet 1   • gabapentin (NEURONTIN) 300 mg capsule Take 1 capsule by mouth 2 (two) times a day     • levothyroxine 100 mcg tablet Take 1 tablet (100 mcg total) by mouth daily 90 tablet 3     No current facility-administered medications for this visit      _______________________________________________________________________  Review of Systems   Constitutional: Negative for activity change, appetite change, fatigue and unexpected weight change  HENT: Negative for congestion, ear pain, rhinorrhea, sore throat and trouble swallowing  Eyes: Negative for pain, discharge and visual disturbance  Respiratory: Negative for cough, shortness of breath and wheezing  Cardiovascular: Negative for chest pain, palpitations and leg swelling  Gastrointestinal: Negative for abdominal pain, constipation, diarrhea, nausea and vomiting  Endocrine: Negative for polydipsia, polyphagia and polyuria  Genitourinary: Negative for difficulty urinating and hematuria  Musculoskeletal: Positive for back pain  Negative for arthralgias, gait problem, myalgias and neck pain  Skin: Negative for color change and rash  Neurological: Positive for numbness  Negative for dizziness, weakness and headaches  Hematological: Negative for adenopathy  Does not bruise/bleed easily  Psychiatric/Behavioral: Negative for dysphoric mood and sleep disturbance  The patient is not nervous/anxious  Objective:  Vitals:    11/18/22 0923   BP: 118/72   BP Location: Left arm   Patient Position: Sitting   Cuff Size: Standard   Pulse: 97   Resp: 16   Temp: (!) 97 3 °F (36 3 °C)   TempSrc: Temporal   SpO2: 98%   Weight: 83 5 kg (184 lb)   Height: 5' 9 25" (1 759 m)     Body mass index is 26 98 kg/m²  Physical Exam  Vitals and nursing note reviewed  Constitutional:       Appearance: Normal appearance  He is well-developed and normal weight  HENT:      Head: Normocephalic and atraumatic  Right Ear: Tympanic membrane, ear canal and external ear normal       Left Ear: Tympanic membrane, ear canal and external ear normal       Nose: Nose normal       Mouth/Throat:      Mouth: Mucous membranes are moist       Pharynx: Oropharynx is clear  No posterior oropharyngeal erythema  Eyes:      Conjunctiva/sclera: Conjunctivae normal       Pupils: Pupils are equal, round, and reactive to light  Neck:      Thyroid: No thyromegaly  Cardiovascular:      Rate and Rhythm: Normal rate and regular rhythm  Heart sounds: Normal heart sounds  No murmur heard  Pulmonary:      Effort: Pulmonary effort is normal       Breath sounds: Normal breath sounds  No wheezing or rales  Abdominal:      General: Bowel sounds are normal  There is no distension  Palpations: Abdomen is soft  There is no mass  Tenderness: There is no abdominal tenderness  Musculoskeletal:         General: No deformity  Normal range of motion  Cervical back: Normal range of motion and neck supple  Right lower leg: No edema        Left lower leg: No edema    Lymphadenopathy:      Cervical: No cervical adenopathy  Skin:     General: Skin is warm and dry  Capillary Refill: Capillary refill takes less than 2 seconds  Findings: No erythema or rash  Neurological:      General: No focal deficit present  Mental Status: He is alert and oriented to person, place, and time  Mental status is at baseline  Cranial Nerves: No cranial nerve deficit  Sensory: Sensory deficit (b/l legs) present  Motor: No abnormal muscle tone  Coordination: Coordination normal    Psychiatric:         Mood and Affect: Mood normal          Behavior: Behavior normal          Thought Content:  Thought content normal          Judgment: Judgment normal

## 2022-11-30 ENCOUNTER — TELEPHONE (OUTPATIENT)
Dept: FAMILY MEDICINE CLINIC | Facility: CLINIC | Age: 62
End: 2022-11-30

## 2022-11-30 DIAGNOSIS — Z76.0 MEDICATION REFILL: Primary | ICD-10-CM

## 2022-11-30 RX ORDER — GABAPENTIN 300 MG/1
300 CAPSULE ORAL 2 TIMES DAILY
Qty: 28 CAPSULE | Refills: 0 | Status: SHIPPED | OUTPATIENT
Start: 2022-11-30 | End: 2022-12-14

## 2022-11-30 NOTE — TELEPHONE ENCOUNTER
Patient called says his:    gabapentin (NEURONTIN) 300 mg capsule Take 1 capsule by mouth 2 (two) times a day       [facility] and they will not be able to get it to him on time  Patient is requesting that a few be sent in to his local pharmacy         Hayley [facility]

## 2023-03-20 DIAGNOSIS — E78.2 MIXED HYPERLIPIDEMIA: Primary | ICD-10-CM

## 2023-03-20 RX ORDER — ATORVASTATIN CALCIUM 10 MG/1
10 TABLET, FILM COATED ORAL DAILY
Qty: 90 TABLET | Refills: 1 | Status: SHIPPED | OUTPATIENT
Start: 2023-03-20 | End: 2023-06-18

## 2023-05-01 DIAGNOSIS — F41.9 ANXIETY DISORDER, UNSPECIFIED TYPE: ICD-10-CM

## 2023-05-01 RX ORDER — CITALOPRAM 20 MG/1
TABLET ORAL
Qty: 30 TABLET | Refills: 5 | Status: SHIPPED | OUTPATIENT
Start: 2023-05-01

## 2023-05-12 ENCOUNTER — OFFICE VISIT (OUTPATIENT)
Dept: FAMILY MEDICINE CLINIC | Facility: CLINIC | Age: 63
End: 2023-05-12

## 2023-05-12 VITALS
BODY MASS INDEX: 28.58 KG/M2 | WEIGHT: 193 LBS | HEART RATE: 76 BPM | OXYGEN SATURATION: 97 % | HEIGHT: 69 IN | TEMPERATURE: 97 F | RESPIRATION RATE: 16 BRPM | SYSTOLIC BLOOD PRESSURE: 124 MMHG | DIASTOLIC BLOOD PRESSURE: 72 MMHG

## 2023-05-12 DIAGNOSIS — F41.9 ANXIETY DISORDER, UNSPECIFIED TYPE: ICD-10-CM

## 2023-05-12 DIAGNOSIS — G62.9 SMALL FIBER NEUROPATHY: ICD-10-CM

## 2023-05-12 DIAGNOSIS — E78.2 MIXED HYPERLIPIDEMIA: Primary | ICD-10-CM

## 2023-05-12 DIAGNOSIS — E03.9 ACQUIRED HYPOTHYROIDISM: ICD-10-CM

## 2023-05-12 DIAGNOSIS — E55.9 VITAMIN D DEFICIENCY: ICD-10-CM

## 2023-05-12 DIAGNOSIS — M1A.0710 IDIOPATHIC CHRONIC GOUT OF RIGHT FOOT WITHOUT TOPHUS: ICD-10-CM

## 2023-05-12 RX ORDER — CITALOPRAM 20 MG/1
20 TABLET ORAL DAILY
Qty: 90 TABLET | Refills: 2 | Status: SHIPPED | OUTPATIENT
Start: 2023-05-12

## 2023-05-12 NOTE — ASSESSMENT & PLAN NOTE
Recheck lipids and LFTs  Continue atorvastatin 10 mg qhs  Advised pt to follow a low cholesterol diet and to exercise on a regular basis

## 2023-05-12 NOTE — PROGRESS NOTES
BMI Counseling: Body mass index is 28 5 kg/m²  The BMI is above normal  Nutrition recommendations include decreasing portion sizes, encouraging healthy choices of fruits and vegetables, consuming healthier snacks and moderation in carbohydrate intake  Exercise recommendations include exercising 3-5 times per week  No pharmacotherapy was ordered  Rationale for BMI follow-up plan is due to patient being overweight or obese  Depression Screening and Follow-up Plan: Patient was screened for depression during today's encounter  They screened negative with a PHQ-2 score of 0  Assessment/Plan:         Problem List Items Addressed This Visit        Endocrine    Acquired hypothyroidism     Recheck TSH and Free T4  Continue levothyroxine 100 mcg qd  Relevant Orders    TSH, 3rd generation    T4, free       Nervous and Auditory    Small fiber neuropathy     Has been stable on gabapentin 300 bid  Musculoskeletal and Integument    Idiopathic chronic gout of right foot without tophus     No recent episodes  Recheck uric acid  Continue uloric 80 mg qd  Sees Rheumatology and his next appointment is in July 2023  Other    Vitamin D deficiency     Level was 43 in Sept 2022  Continue 2000 U qd  Mixed hyperlipidemia - Primary     Recheck lipids and LFTs  Continue atorvastatin 10 mg qhs  Advised pt to follow a low cholesterol diet and to exercise on a regular basis  Relevant Orders    Lipid panel    Anxiety disorder     Stress level has been ok  Continue citalopram 20 mg qd  Relevant Medications    citalopram (CeleXA) 20 mg tablet         Subjective:      Patient ID: Gildardo Ortiz is a 65 y o  male  Pt here for f/u HL, Hypothyroidism, JAMES, Gout, Vit D def, Neuropathy  Doing ok  No cp/sob  No HA  Gets anxious at times  Walking more  No recent gout  No change in neuropathy  Taking gabapentin 300 mg bid  No new c/o         The following portions of the patient's history were reviewed and updated as appropriate:   Past Medical History:  He has a past medical history of Anxiety (9/15/2022), Disease of thyroid gland, Gout, Hyperlipidemia, Hypothyroidism, Neuropathy, Osteoarthritis, Plantar fasciitis of right foot, and Schwannoma  ,  _______________________________________________________________________  Medical Problems:  does not have any pertinent problems on file ,  _______________________________________________________________________  Past Surgical History:   has a past surgical history that includes Tonsillectomy  ,  _______________________________________________________________________  Family History:  family history includes Arthritis in his mother; Cancer in his family and father; Diabetes in his family, mother, paternal grandmother, and sister; Emphysema in his mother; Gout in his family; Heart disease in his family, maternal grandfather, and sister; Hypertension in his family; Lung cancer in his father; No Known Problems in his son ,  _______________________________________________________________________  Social History:   reports that he has never smoked  He has never used smokeless tobacco  He reports current alcohol use  He reports that he does not use drugs  ,  _______________________________________________________________________  Allergies:  is allergic to penicillin v and penicillins     _______________________________________________________________________  Current Outpatient Medications   Medication Sig Dispense Refill   • atorvastatin (LIPITOR) 10 mg tablet Take 1 tablet (10 mg total) by mouth daily 90 tablet 1   • Cholecalciferol 50 MCG (2000 UT) CAPS Take 1 capsule by mouth daily     • citalopram (CeleXA) 20 mg tablet Take 1 tablet (20 mg total) by mouth daily 90 tablet 2   • febuxostat (ULORIC) 80 MG TABS TAKE 1 TABLET DAILY 90 tablet 3   • gabapentin (NEURONTIN) 300 mg capsule Take 1 capsule (300 mg total) by mouth 2 (two) times a day for 14 days 28 capsule "0   • levothyroxine 100 mcg tablet Take 1 tablet (100 mcg total) by mouth daily 90 tablet 3     No current facility-administered medications for this visit      _______________________________________________________________________  Review of Systems   Constitutional: Negative for activity change, appetite change and fatigue  Respiratory: Negative for chest tightness and shortness of breath  Cardiovascular: Negative for chest pain and palpitations  Gastrointestinal: Negative for abdominal pain, diarrhea, nausea and vomiting  Neurological: Positive for numbness  Negative for dizziness, tremors, light-headedness and headaches  Psychiatric/Behavioral: Negative for agitation, decreased concentration, dysphoric mood, self-injury, sleep disturbance and suicidal ideas  The patient is not nervous/anxious  Objective:  Vitals:    05/12/23 0900   BP: 124/72   BP Location: Left arm   Patient Position: Sitting   Cuff Size: Standard   Pulse: 76   Resp: 16   Temp: (!) 97 °F (36 1 °C)   TempSrc: Tympanic   SpO2: 97%   Weight: 87 5 kg (193 lb)   Height: 5' 9\" (1 753 m)     Body mass index is 28 5 kg/m²  Physical Exam  Vitals and nursing note reviewed  Constitutional:       Appearance: Normal appearance  He is well-developed and normal weight  Neck:      Thyroid: No thyromegaly  Cardiovascular:      Rate and Rhythm: Normal rate and regular rhythm  Heart sounds: Normal heart sounds  No murmur heard  Pulmonary:      Effort: Pulmonary effort is normal  No respiratory distress  Breath sounds: Normal breath sounds  No wheezing  Musculoskeletal:      Cervical back: Normal range of motion and neck supple  Right lower leg: No edema  Left lower leg: No edema  Lymphadenopathy:      Cervical: No cervical adenopathy  Neurological:      Mental Status: He is alert and oriented to person, place, and time  Cranial Nerves: No cranial nerve deficit     Psychiatric:         Mood and Affect: " Mood normal          Behavior: Behavior normal          Thought Content:  Thought content normal          Judgment: Judgment normal

## 2023-05-12 NOTE — ASSESSMENT & PLAN NOTE
No recent episodes  Recheck uric acid  Continue uloric 80 mg qd  Sees Rheumatology and his next appointment is in July 2023

## 2023-10-11 DIAGNOSIS — E03.9 ACQUIRED HYPOTHYROIDISM: ICD-10-CM

## 2023-10-11 RX ORDER — LEVOTHYROXINE SODIUM 0.1 MG/1
100 TABLET ORAL DAILY
Qty: 90 TABLET | Refills: 3 | Status: SHIPPED | OUTPATIENT
Start: 2023-10-11

## 2023-10-18 DIAGNOSIS — E78.2 MIXED HYPERLIPIDEMIA: ICD-10-CM

## 2023-10-18 RX ORDER — ATORVASTATIN CALCIUM 10 MG/1
10 TABLET, FILM COATED ORAL DAILY
Qty: 90 TABLET | Refills: 1 | Status: SHIPPED | OUTPATIENT
Start: 2023-10-18

## 2023-11-27 DIAGNOSIS — F41.9 ANXIETY DISORDER, UNSPECIFIED TYPE: ICD-10-CM

## 2023-11-27 NOTE — TELEPHONE ENCOUNTER
Pt called to request refill of citalopram 40mg. Chart shows that he should have another refill available (90-day + 2 refills sent 05/12/23). He will follow up with his pharmacy.

## 2023-11-30 ENCOUNTER — OFFICE VISIT (OUTPATIENT)
Dept: FAMILY MEDICINE CLINIC | Facility: CLINIC | Age: 63
End: 2023-11-30
Payer: COMMERCIAL

## 2023-11-30 VITALS
SYSTOLIC BLOOD PRESSURE: 136 MMHG | BODY MASS INDEX: 29.77 KG/M2 | HEIGHT: 69 IN | RESPIRATION RATE: 16 BRPM | WEIGHT: 201 LBS | OXYGEN SATURATION: 98 % | DIASTOLIC BLOOD PRESSURE: 78 MMHG | HEART RATE: 72 BPM

## 2023-11-30 DIAGNOSIS — M1A.0710 IDIOPATHIC CHRONIC GOUT OF RIGHT FOOT WITHOUT TOPHUS: ICD-10-CM

## 2023-11-30 DIAGNOSIS — E03.9 ACQUIRED HYPOTHYROIDISM: ICD-10-CM

## 2023-11-30 DIAGNOSIS — G62.9 SMALL FIBER NEUROPATHY: ICD-10-CM

## 2023-11-30 DIAGNOSIS — E78.2 MIXED HYPERLIPIDEMIA: Primary | ICD-10-CM

## 2023-11-30 DIAGNOSIS — R19.4 CHANGE IN BOWEL HABITS: ICD-10-CM

## 2023-11-30 DIAGNOSIS — E55.9 VITAMIN D DEFICIENCY: ICD-10-CM

## 2023-11-30 DIAGNOSIS — R73.01 IMPAIRED FASTING GLUCOSE: ICD-10-CM

## 2023-11-30 DIAGNOSIS — F41.9 ANXIETY DISORDER, UNSPECIFIED TYPE: ICD-10-CM

## 2023-11-30 DIAGNOSIS — Z12.5 PROSTATE CANCER SCREENING: ICD-10-CM

## 2023-11-30 PROCEDURE — 99214 OFFICE O/P EST MOD 30 MIN: CPT | Performed by: FAMILY MEDICINE

## 2023-11-30 RX ORDER — CITALOPRAM 20 MG/1
20 TABLET ORAL DAILY
Qty: 90 TABLET | Refills: 2 | Status: SHIPPED | OUTPATIENT
Start: 2023-11-30

## 2023-11-30 NOTE — ASSESSMENT & PLAN NOTE
No recent episodes. Uric acid was 3.6 in July 2023. Continue uloric 80 mg qd. Sees Rheumatology and his next appointment is in Feb 2024.

## 2023-11-30 NOTE — PROGRESS NOTES
Depression Screening and Follow-up Plan: Patient was screened for depression during today's encounter. They screened negative with a PHQ-2 score of 0. Assessment/Plan:         Problem List Items Addressed This Visit        Endocrine    Impaired fasting glucose      in July 2023. A1C has been normal in the past. Will recheck A1C. Relevant Orders    Hemoglobin A1C    Acquired hypothyroidism     TSH 0.65 and Free T4 1.03 in July 2023. Continue levothyroxine 100 mcg qd. Nervous and Auditory    Small fiber neuropathy     Has been stable on gabapentin 300 bid. Saw Neurology for follow-up in Nov 2023. Musculoskeletal and Integument    Idiopathic chronic gout of right foot without tophus     No recent episodes. Uric acid was 3.6 in July 2023. Continue uloric 80 mg qd. Sees Rheumatology and his next appointment is in Feb 2024. Other    Vitamin D deficiency     Recheck level. Continue 2000 U qd. Relevant Orders    Vitamin D 25 hydroxy    Mixed hyperlipidemia - Primary     LDL 76 and LFTs ok in July 2023. Continue atorvastatin 10 mg qhs. Advised pt to follow a low cholesterol diet and to exercise on a regular basis. Change in bowel habits     Patient has had increased bowel movements since had colonoscopy. Will try probiotic and imodium as needed. If no better, patient to follow-up with Gastroenterology. Anxiety disorder     Stress level has been ok. Continue citalopram 20 mg qd. Relevant Medications    citalopram (CeleXA) 20 mg tablet   Other Visit Diagnoses     Prostate cancer screening        Relevant Orders    PSA, Total Screen            Subjective:      Patient ID: Grace Deluca is a 59 y.o. male. Patient here for follow-up Hyperlipidemia, Hypothyroidism, Vitamin D deficiency, Impaired Fasting Glucose, Gout, Neuropathy, Anxiety. Patient doing ok. No chest pain or shortness of breath. No headaches. No abdominal pain.  Saw Dermatology recently and going to have mole removed. Patient has had more frequent bowel movements recently. Started after had colonoscopy. Gets a lot of gas too. The following portions of the patient's history were reviewed and updated as appropriate:   Past Medical History:  He has a past medical history of Anxiety (9/15/2022), Disease of thyroid gland, Gout, Hyperlipidemia, Hypothyroidism, Neuropathy, Osteoarthritis, Plantar fasciitis of right foot, and Schwannoma. ,  _______________________________________________________________________  Medical Problems:  does not have any pertinent problems on file.,  _______________________________________________________________________  Past Surgical History:   has a past surgical history that includes Tonsillectomy. ,  _______________________________________________________________________  Family History:  family history includes Arthritis in his mother; Cancer in his family and father; Diabetes in his family, mother, paternal grandmother, and sister; Emphysema in his mother; Gout in his family; Heart disease in his family, maternal grandfather, and sister; Hypertension in his family; Lung cancer in his father; No Known Problems in his son.,  _______________________________________________________________________  Social History:   reports that he has never smoked. He has never used smokeless tobacco. He reports current alcohol use. He reports that he does not use drugs. ,  _______________________________________________________________________  Allergies:  is allergic to penicillin v and penicillins. .  _______________________________________________________________________  Current Outpatient Medications   Medication Sig Dispense Refill   • atorvastatin (LIPITOR) 10 mg tablet TAKE 1 TABLET DAILY 90 tablet 1   • Cholecalciferol 50 MCG (2000 UT) CAPS Take 1 capsule by mouth daily     • citalopram (CeleXA) 20 mg tablet Take 1 tablet (20 mg total) by mouth daily 90 tablet 2   • febuxostat (ULORIC) 80 MG TABS TAKE 1 TABLET DAILY 90 tablet 3   • gabapentin (NEURONTIN) 300 mg capsule Take 1 capsule (300 mg total) by mouth 2 (two) times a day for 14 days 28 capsule 0   • levothyroxine 100 mcg tablet TAKE 1 TABLET DAILY 90 tablet 3     No current facility-administered medications for this visit.     _______________________________________________________________________  Review of Systems   Constitutional:  Negative for activity change, appetite change and fatigue. Respiratory:  Negative for chest tightness and shortness of breath. Cardiovascular:  Negative for chest pain and palpitations. Gastrointestinal:  Negative for abdominal pain, diarrhea, nausea and vomiting. Frequent bowel movements   Neurological:  Positive for numbness. Negative for dizziness, tremors, light-headedness and headaches. Psychiatric/Behavioral:  Negative for agitation, decreased concentration, dysphoric mood, self-injury, sleep disturbance and suicidal ideas. The patient is not nervous/anxious. Objective:  Vitals:    11/30/23 0847   BP: 136/78   BP Location: Left arm   Patient Position: Sitting   Cuff Size: Standard   Pulse: 72   Resp: 16   SpO2: 98%   Weight: 91.2 kg (201 lb)   Height: 5' 9.25" (1.759 m)     Body mass index is 29.47 kg/m². Physical Exam  Vitals and nursing note reviewed. Constitutional:       Appearance: Normal appearance. He is well-developed and normal weight. Neck:      Thyroid: No thyromegaly. Cardiovascular:      Rate and Rhythm: Normal rate and regular rhythm. Heart sounds: Normal heart sounds. No murmur heard. Pulmonary:      Effort: Pulmonary effort is normal. No respiratory distress. Breath sounds: Normal breath sounds. No wheezing. Musculoskeletal:      Cervical back: Normal range of motion and neck supple. Right lower leg: No edema. Left lower leg: No edema. Lymphadenopathy:      Cervical: No cervical adenopathy. Neurological:      Mental Status: He is alert and oriented to person, place, and time. Cranial Nerves: No cranial nerve deficit. Psychiatric:         Mood and Affect: Mood normal.         Behavior: Behavior normal.         Thought Content:  Thought content normal.         Judgment: Judgment normal.

## 2023-11-30 NOTE — ASSESSMENT & PLAN NOTE
LDL 76 and LFTs ok in July 2023. Continue atorvastatin 10 mg qhs. Advised pt to follow a low cholesterol diet and to exercise on a regular basis.

## 2023-11-30 NOTE — ASSESSMENT & PLAN NOTE
Patient has had increased bowel movements since had colonoscopy. Will try probiotic and imodium as needed. If no better, patient to follow-up with Gastroenterology.

## 2024-01-08 ENCOUNTER — OFFICE VISIT (OUTPATIENT)
Dept: FAMILY MEDICINE CLINIC | Facility: CLINIC | Age: 64
End: 2024-01-08
Payer: COMMERCIAL

## 2024-01-08 VITALS
TEMPERATURE: 97.2 F | DIASTOLIC BLOOD PRESSURE: 76 MMHG | WEIGHT: 200 LBS | BODY MASS INDEX: 29.62 KG/M2 | SYSTOLIC BLOOD PRESSURE: 132 MMHG | RESPIRATION RATE: 16 BRPM | OXYGEN SATURATION: 98 % | HEIGHT: 69 IN | HEART RATE: 74 BPM

## 2024-01-08 DIAGNOSIS — J02.0 ACUTE STREPTOCOCCAL PHARYNGITIS: Primary | ICD-10-CM

## 2024-01-08 DIAGNOSIS — Z88.0 PENICILLIN ALLERGY: ICD-10-CM

## 2024-01-08 LAB — S PYO AG THROAT QL: POSITIVE

## 2024-01-08 PROCEDURE — 99214 OFFICE O/P EST MOD 30 MIN: CPT | Performed by: FAMILY MEDICINE

## 2024-01-08 PROCEDURE — 87880 STREP A ASSAY W/OPTIC: CPT | Performed by: FAMILY MEDICINE

## 2024-01-08 RX ORDER — CLINDAMYCIN HYDROCHLORIDE 300 MG/1
600 CAPSULE ORAL 3 TIMES DAILY
Qty: 60 CAPSULE | Refills: 0 | Status: SHIPPED | OUTPATIENT
Start: 2024-01-08 | End: 2024-01-18

## 2024-01-08 NOTE — PROGRESS NOTES
Subjective:      Patient ID: Kodi Jaquez is a 63 y.o. male.    With sore throat, fatigue, ear pressure for 4 days        Past Medical History:   Diagnosis Date    Anxiety 9/15/2022    Disease of thyroid gland     hypo    Gout     Hyperlipidemia     Hypothyroidism     Neuropathy     Osteoarthritis     Plantar fasciitis of right foot     Schwannoma        Family History   Problem Relation Age of Onset    Diabetes Mother     Emphysema Mother     Arthritis Mother     Lung cancer Father     Cancer Father     Heart disease Sister     Diabetes Sister     No Known Problems Son     Heart disease Maternal Grandfather     Diabetes Paternal Grandmother     Gout Family     Diabetes Family     Cancer Family     Hypertension Family     Heart disease Family        Past Surgical History:   Procedure Laterality Date    TONSILLECTOMY          reports that he has never smoked. He has never used smokeless tobacco. He reports current alcohol use. He reports that he does not use drugs.      Current Outpatient Medications:     atorvastatin (LIPITOR) 10 mg tablet, TAKE 1 TABLET DAILY, Disp: 90 tablet, Rfl: 1    Cholecalciferol 50 MCG (2000 UT) CAPS, Take 1 capsule by mouth daily, Disp: , Rfl:     citalopram (CeleXA) 20 mg tablet, Take 1 tablet (20 mg total) by mouth daily, Disp: 90 tablet, Rfl: 2    febuxostat (ULORIC) 80 MG TABS, TAKE 1 TABLET DAILY, Disp: 90 tablet, Rfl: 3    gabapentin (NEURONTIN) 300 mg capsule, Take 1 capsule (300 mg total) by mouth 2 (two) times a day for 14 days, Disp: 28 capsule, Rfl: 0    levothyroxine 100 mcg tablet, TAKE 1 TABLET DAILY, Disp: 90 tablet, Rfl: 3    The following portions of the patient's history were reviewed and updated as appropriate: allergies, current medications, past family history, past medical history, past social history, past surgical history and problem list.    Review of Systems   Constitutional:  Positive for fatigue. Negative for chills and fever.   HENT:  Negative for  "congestion, ear discharge, ear pain, rhinorrhea and sore throat.    Eyes:  Negative for discharge, redness and itching.   Respiratory:  Negative for chest tightness, shortness of breath and wheezing.    Cardiovascular:  Negative for chest pain and palpitations.   Gastrointestinal:  Negative for abdominal pain, constipation and diarrhea.   Genitourinary:  Negative for dysuria.   Skin:  Negative for pallor and rash.   Neurological:  Negative for dizziness, weakness, numbness and headaches.         PHQ-2/9 Depression Screening    Little interest or pleasure in doing things: 0 - not at all  Feeling down, depressed, or hopeless: 0 - not at all  PHQ-2 Score: 0  PHQ-2 Interpretation: Negative depression screen             Objective:    /76 (BP Location: Left arm, Patient Position: Sitting, Cuff Size: Adult)   Pulse 74   Temp (!) 97.2 °F (36.2 °C) (Tympanic)   Resp 16   Ht 5' 9.25\" (1.759 m)   Wt 90.7 kg (200 lb)   SpO2 98%   BMI 29.32 kg/m²      Physical Exam  Vitals and nursing note reviewed.   Constitutional:       Appearance: Normal appearance. He is well-developed.   HENT:      Head: Normocephalic and atraumatic.      Comments: malcolm     Right Ear: Tympanic membrane, ear canal and external ear normal.      Left Ear: Tympanic membrane, ear canal and external ear normal.      Nose: Nose normal.      Mouth/Throat:      Mouth: Mucous membranes are moist.      Pharynx: Posterior oropharyngeal erythema present.      Tonsils: 0 on the right. 0 on the left.      Comments: Surgically absent tonsils  Eyes:      Conjunctiva/sclera: Conjunctivae normal.      Pupils: Pupils are equal, round, and reactive to light.   Cardiovascular:      Rate and Rhythm: Normal rate and regular rhythm.      Heart sounds: Normal heart sounds. No murmur heard.     No gallop.   Pulmonary:      Effort: Pulmonary effort is normal. No respiratory distress.      Breath sounds: Normal breath sounds. No wheezing or rales.   Abdominal:      General: " "There is no distension.      Palpations: Abdomen is soft.      Tenderness: There is no abdominal tenderness.   Musculoskeletal:         General: No tenderness or deformity.      Cervical back: Normal range of motion and neck supple.      Right lower leg: No edema.      Left lower leg: No edema.   Lymphadenopathy:      Cervical: Cervical adenopathy present.   Skin:     Coloration: Skin is not pale.      Findings: No erythema or rash.   Neurological:      General: No focal deficit present.      Mental Status: He is alert. Mental status is at baseline.   Psychiatric:         Mood and Affect: Mood normal.         Behavior: Behavior normal.         Assessment/Plan:  Problem List Items Addressed This Visit    None  Visit Diagnoses       Acute streptococcal pharyngitis    -  Primary    Relevant Orders    POCT rapid strepA          Rapid strep positive   rapid strep positive, high suspicion, will treat with antibiotics.      Advised supportive care, encouraged increased fluid hydration, rest, tylenol/ ibuprofen PRN fever/ pain.     No suspicion for Influenza at this time and no antiviral medication warranted.    RTO if not improving or worsening.    URI complications: Explained that today's exam is benign. Signs such as severe cough, pain with breathing, persistent high fevers, ear pain, and moderate/severe sinus pressure--may suggest secondary bacterial infection and should prompt follow up.                Depression Screening and Follow-up Plan: Patient was screened for depression during today's encounter. They screened negative with a PHQ-2 score of 0.      Read package inserts for all medications before starting a new medications, call me if you have any questions.    Patient was given opportunity to ask questions and all questions were answered.    Disclaimer: Portions of the record may have been created with voice recognition software. Occasional wrong word or \"sound a like\" substitutions may have occurred due to the " inherent limitations of voice recognition software. Read the chart carefully and recognize, using context, where substitutions have occurred. I have used the Epic copy/forward function to compose this note. I have reviewed my current note to ensure it reflects the current patient status, exam, assessment and plan.

## 2024-01-24 ENCOUNTER — TELEPHONE (OUTPATIENT)
Age: 64
End: 2024-01-24

## 2024-01-30 ENCOUNTER — TELEPHONE (OUTPATIENT)
Age: 64
End: 2024-01-30

## 2024-01-30 NOTE — TELEPHONE ENCOUNTER
PA for Uloric Approved   Date(s) approved 12/31/23-1/29/25  Case # [id number]    Patient advised by [x] [employer] Message                      [] Phone call       Pharmacy advised by [x]Fax                                     []Phone call    Approval letter scanned into Media Yes

## 2024-01-30 NOTE — TELEPHONE ENCOUNTER
PA for Uloric    Submitted via  [x]CMM-KEY [id number]  []Surescripts-Case ID #    []Faxed to plan   []Other website    []Phone call Case ID #      Office notes sent, clinical questions answered. Awaiting determination

## 2024-02-01 ENCOUNTER — OFFICE VISIT (OUTPATIENT)
Age: 64
End: 2024-02-01
Payer: COMMERCIAL

## 2024-02-01 VITALS
HEART RATE: 72 BPM | TEMPERATURE: 96.2 F | OXYGEN SATURATION: 97 % | BODY MASS INDEX: 28.58 KG/M2 | WEIGHT: 199.6 LBS | SYSTOLIC BLOOD PRESSURE: 118 MMHG | DIASTOLIC BLOOD PRESSURE: 72 MMHG | HEIGHT: 70 IN

## 2024-02-01 DIAGNOSIS — M1A.0710 IDIOPATHIC CHRONIC GOUT OF RIGHT FOOT WITHOUT TOPHUS: Primary | ICD-10-CM

## 2024-02-01 DIAGNOSIS — L40.0 PLAQUE PSORIASIS: ICD-10-CM

## 2024-02-01 DIAGNOSIS — M54.16 CHRONIC LUMBAR RADICULOPATHY: ICD-10-CM

## 2024-02-01 DIAGNOSIS — M47.816 LUMBAR SPONDYLOSIS: ICD-10-CM

## 2024-02-01 DIAGNOSIS — M72.2 PLANTAR FASCIITIS, RIGHT: ICD-10-CM

## 2024-02-01 DIAGNOSIS — G62.9 SMALL FIBER NEUROPATHY: ICD-10-CM

## 2024-02-01 DIAGNOSIS — M15.0 PRIMARY GENERALIZED (OSTEO)ARTHRITIS: ICD-10-CM

## 2024-02-01 PROCEDURE — 99214 OFFICE O/P EST MOD 30 MIN: CPT | Performed by: INTERNAL MEDICINE

## 2024-02-01 NOTE — PROGRESS NOTES
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  Continue to monitor serum uric acid to make sure he is in target range of less than 6.0.  Most recent serum uric acid was 3.2 on 1/31/2024.  Check CMP to rule out medication toxicity.  Prescription was given for lab work.  Plan follow-up 6 months.    Plaque psoriasis  History plaque psoriasis with extensor surface  elbow lesions currently quiescent.  Rule out psoriatic arthritis in view of history of right plantar fasciitis.  Lumbar spine films from July 2022 significant for degenerative changes L5-S1 with no evidence for axial inflammatory disease.  SI joints mild degenerative changes with no inflammatory changes.  No evidence for spondylarthritis. Monitor labs for disease activity and medication toxicity.  Follow-up 6 months or earlier if needed.    Plantar fasciitis, right  Continue heel stretching more consistently. Some tenderness right plantar fascia with palpation but no clinical sx's.  Encouraged continued use of appropriate shoe gear with orthotics, and avoid walking barefoot. Follow-up 6 months.    Small fiber neuropathy  Small fiber neuropathy generally stable with gabapentin.  Follow-up neurology.  Monitor labs for medication toxicity.    Primary generalized (osteo)arthritis  Interphalangeal osteoarthritis hands and feet with occasional left thumb arthralgias.  Lumbar spondylosis with no current complaints including radicular symptoms.  He has been walking his dog on a daily basis without difficulty.  X-rays lumbar spine from July 2022 significant for mild degenerative changes L5-S1.  SI joints no inflammatory changes, however mild degenerative changes noted.  No current knee arthralgias.  Continue home exercise and stretching program.  Monitor labs for medication toxicity.    Chronic lumbar radiculopathy  No current radicular symptoms.  Review of lumbar spine x-ray dated 7/16/2022 significant  for mild degenerative disc disease.  Mild SI joint degenerative changes noted.  Encourage home exercise program as tolerated.  Continue to monitor.    Lumbar spondylosis  Lumbar spondylosis on plain films with no current radicular symptoms.  Encourage home exercise program as tolerated.  Continue to monitor.         Problem List Items Addressed This Visit     Primary generalized (osteo)arthritis     Interphalangeal osteoarthritis hands and feet with occasional left thumb arthralgias.  Lumbar spondylosis with no current complaints including radicular symptoms.  He has been walking his dog on a daily basis without difficulty.  X-rays lumbar spine from July 2022 significant for mild degenerative changes L5-S1.  SI joints no inflammatory changes, however mild degenerative changes noted.  No current knee arthralgias.  Continue home exercise and stretching program.  Monitor labs for medication toxicity.         Lumbar spondylosis     Lumbar spondylosis on plain films with no current radicular symptoms.  Encourage home exercise program as tolerated.  Continue to monitor.         Small fiber neuropathy     Small fiber neuropathy generally stable with gabapentin.  Follow-up neurology.  Monitor labs for medication toxicity.         Chronic lumbar radiculopathy     No current radicular symptoms.  Review of lumbar spine x-ray dated 7/16/2022 significant for mild degenerative disc disease.  Mild SI joint degenerative changes noted.  Encourage home exercise program as tolerated.  Continue to monitor.         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  Continue to monitor serum uric acid to make sure he is in target range of less than 6.0.  Most recent serum uric acid was 3.2 on 1/31/2024.  Check CMP to rule out medication toxicity.  Prescription was given for lab work.  Plan follow-up 6 months.         Relevant Orders    C-reactive protein    Sedimentation rate,  automated    CBC and differential    Comprehensive metabolic panel    Urinalysis with microscopic    Uric acid    Plaque psoriasis     History plaque psoriasis with extensor surface  elbow lesions currently quiescent.  Rule out psoriatic arthritis in view of history of right plantar fasciitis.  Lumbar spine films from July 2022 significant for degenerative changes L5-S1 with no evidence for axial inflammatory disease.  SI joints mild degenerative changes with no inflammatory changes.  No evidence for spondylarthritis. Monitor labs for disease activity and medication toxicity.  Follow-up 6 months or earlier if needed.         Plantar fasciitis, right     Continue heel stretching more consistently. Some tenderness right plantar fascia with palpation but no clinical sx's.  Encouraged continued use of appropriate shoe gear with orthotics, and avoid walking barefoot. Follow-up 6 months.                Reviewed records, labs, and imaging with the patient in detail.  Counseled patient.  Discussion regarding my findings and recommendations.  Office visit with documentation 30 min.    Subjective:      Patient ID: Kodi Jaquez is a 63 y.o. male.    Patient with history of gout quiescent on Uloric, with history of primary generalized osteoarthritis with interphalangeal osteoarthritis hands and feet, and lumbar spondylosis, who presents for follow up.  Other medical problems include small fiber neuropathy, primary hypothyroidism, hyperlipidemia, history of T2-3 schwannoma, and mechanical foot deformities.  Patient has chronic dysesthesias bilateral feet.  He had been diagnosed with plantar fasciitis right foot, and was treated with injection and orthotics by Podiatry June 2022. He has not been consistent with home stretches.  Back and hip symptoms improved.  He has been walking his dog daily.  Patient has not had any radicular symptoms right lower extremity. Morning stiffness is seconds duration.  He has occasional left thumb  "arthralgias.  Patient had colonoscopy October 7, 2022 with 2 tubular adenomas, one in the transverse colon, one in the descending colon.  Review of systems otherwise unremarkable.    Lab work dated 1/31/2024 significant for white blood cell count 4.3 with absolute neutrophil count 1.9.  Platelets 196.  Glucose 120.  Creatinine 1.5 with estimated GFR 72.  CRP less than 1.0.  Sed rate 1.  Uric acid in target range at 3.2.  Hemoglobin A1c 5.3.  PSA 1.23.  Vitamin D 33.    X-rays dated 7/16/2022 reveal LS-spine with mild degenerative disc disease.  Mild SI degenerative changes.  Right foot small calcaneal spur.        Allergies  Allergies   Allergen Reactions   • Penicillin V Hives     Unknown   • Penicillins      \"and other cillins\"       Home Medications    Current Outpatient Medications:   •  atorvastatin (LIPITOR) 10 mg tablet, TAKE 1 TABLET DAILY, Disp: 90 tablet, Rfl: 1  •  Cholecalciferol 50 MCG (2000 UT) CAPS, Take 1 capsule by mouth daily, Disp: , Rfl:   •  citalopram (CeleXA) 20 mg tablet, Take 1 tablet (20 mg total) by mouth daily, Disp: 90 tablet, Rfl: 2  •  febuxostat (ULORIC) 80 MG TABS, TAKE 1 TABLET DAILY, Disp: 90 tablet, Rfl: 3  •  gabapentin (NEURONTIN) 300 mg capsule, Take 1 capsule (300 mg total) by mouth 2 (two) times a day for 14 days, Disp: 28 capsule, Rfl: 0  •  levothyroxine 100 mcg tablet, TAKE 1 TABLET DAILY, Disp: 90 tablet, Rfl: 3    Past Medical History  Past Medical History:   Diagnosis Date   • Anxiety 9/15/2022   • Disease of thyroid gland     hypo   • Gout    • Hyperlipidemia    • Hypothyroidism    • Neuropathy    • Osteoarthritis    • Plantar fasciitis of right foot    • Schwannoma        Past Surgical History   Past Surgical History:   Procedure Laterality Date   • TONSILLECTOMY         Family History   Family History   Problem Relation Age of Onset   • Diabetes Mother    • Emphysema Mother    • Arthritis Mother    • Lung cancer Father    • Cancer Father    • Heart disease Sister  " "  • Diabetes Sister    • No Known Problems Son    • Heart disease Maternal Grandfather    • Diabetes Paternal Grandmother    • Gout Family    • Diabetes Family    • Cancer Family    • Hypertension Family    • Heart disease Family        The following portions of the patient's history were reviewed and updated as appropriate: allergies, current medications, past family history, past medical history, past social history, past surgical history, and problem list.    Review of Systems   Constitutional:  Negative for chills and fever.   HENT:  Negative for ear pain and sore throat.    Eyes:  Negative for pain and visual disturbance.   Respiratory:  Negative for cough and shortness of breath.    Cardiovascular:  Negative for chest pain and palpitations.   Gastrointestinal:  Negative for abdominal pain and vomiting.   Genitourinary:  Negative for dysuria and hematuria.   Musculoskeletal:  Positive for arthralgias and back pain.        History right plantar fasciitis. Plantar fascia stable with using orthotics. Rare left thumb arthralgias. Am gel seconds.   Skin:  Positive for rash. Negative for color change.        Left elbow extensor surface pruritic rash resolved.   Neurological:  Positive for numbness. Negative for seizures and syncope.        Dysesthesias ankles/feet  Balance difficulty   All other systems reviewed and are negative.        Objective:      /72 (BP Location: Right arm, Patient Position: Sitting, Cuff Size: Adult)   Pulse 72   Temp (!) 96.2 °F (35.7 °C) (Tympanic)   Ht 5' 10.25\" (1.784 m)   Wt 90.5 kg (199 lb 9.6 oz)   SpO2 97%   BMI 28.44 kg/m²          Physical Exam  Vitals reviewed.   Constitutional:       Appearance: Normal appearance.   HENT:      Head: Normocephalic.      Nose:      Comments: Nose and throat unremarkable  Cardiovascular:      Rate and Rhythm: Regular rhythm.   Pulmonary:      Breath sounds: Normal breath sounds.   Abdominal:      Palpations: Abdomen is soft. "   Musculoskeletal:      Comments: Neck slightly decreased lateral flexion.  Shoulders, elbows, and wrists full range of motion.  Tinel's negative bilaterally.  Hands squaring 1st carpometacarpal joints bilaterally with Heberden's and early Brandi's nodes.  No synovitis noted.  Straight leg raising negative bilaterally.  Hips full range of motion.  Knees full range of motion with patellofemoral crepitus. Ankles full range of motion.  Feet rearfoot hyperpronation with midfoot cavus deformities, hallux valgus deformities left greater than right, early tailor's bunions, and tenderness along the right plantar fascia insertion.   Skin:     Comments: No psoriasis.  No onycholysis.  No nail pitting.               This note was written in part using the assistance of the [employer] [id number] microphone system. Those portions using this system have been dictated and not read.

## 2024-02-01 NOTE — PATIENT INSTRUCTIONS
Continue same plan as before.  Get lab work 2 weeks before your next office visit.  I would encourage you to do heel stretches for the plantar fasciitis as well as a home exercise program.  Try a thumb spica or cool comfort thumb splint which she can get on [employer].  It will help the thumb aching.

## 2024-02-02 ENCOUNTER — TELEPHONE (OUTPATIENT)
Age: 64
End: 2024-02-02

## 2024-02-05 NOTE — TELEPHONE ENCOUNTER
PA for Febuxostat not required     Reason (screenshot if applicable)    Prior Authorization not required for patient/medication           Message sent to provider pool yes

## 2024-04-15 DIAGNOSIS — E78.2 MIXED HYPERLIPIDEMIA: ICD-10-CM

## 2024-04-15 RX ORDER — ATORVASTATIN CALCIUM 10 MG/1
10 TABLET, FILM COATED ORAL DAILY
Qty: 90 TABLET | Refills: 1 | Status: SHIPPED | OUTPATIENT
Start: 2024-04-15

## 2024-05-21 DIAGNOSIS — F41.9 ANXIETY DISORDER, UNSPECIFIED TYPE: ICD-10-CM

## 2024-05-21 RX ORDER — CITALOPRAM 20 MG/1
20 TABLET ORAL DAILY
Qty: 30 TABLET | Refills: 5 | Status: SHIPPED | OUTPATIENT
Start: 2024-05-21

## 2024-05-21 NOTE — TELEPHONE ENCOUNTER
Reason for call: Patient is going away ([place]) and would like this sent to the pharmacy ASAP  Advise I will send for approval at [facility]    [x] Refill   [] Prior Auth  [] Other:     Office:   [x] PCP/Provider - Dr Mensah  [] Specialty/Provider -     Medication: citalopram     Dose/Frequency: 10 mg daily     Quantity: 30D w refill    Pharmacy: [facility]     Does the patient have enough for 3 days?   [] Yes   [] No - Send as [facility] to P[facility]

## 2024-05-22 ENCOUNTER — OFFICE VISIT (OUTPATIENT)
Dept: FAMILY MEDICINE CLINIC | Facility: CLINIC | Age: 64
End: 2024-05-22
Payer: COMMERCIAL

## 2024-05-22 VITALS
HEIGHT: 70 IN | WEIGHT: 201.8 LBS | DIASTOLIC BLOOD PRESSURE: 73 MMHG | HEART RATE: 72 BPM | SYSTOLIC BLOOD PRESSURE: 130 MMHG | TEMPERATURE: 97.9 F | BODY MASS INDEX: 28.89 KG/M2 | OXYGEN SATURATION: 96 %

## 2024-05-22 DIAGNOSIS — J06.9 UPPER RESPIRATORY TRACT INFECTION, UNSPECIFIED TYPE: Primary | ICD-10-CM

## 2024-05-22 LAB
SL AMB POCT RAPID FLU A: NEGATIVE
SL AMB POCT RAPID FLU B: NEGATIVE

## 2024-05-22 PROCEDURE — 87804 INFLUENZA ASSAY W/OPTIC: CPT

## 2024-05-22 PROCEDURE — 99213 OFFICE O/P EST LOW 20 MIN: CPT

## 2024-05-22 RX ORDER — FEXOFENADINE HCL AND PSEUDOEPHEDRINE HCI 180; 240 MG/1; MG/1
1 TABLET, EXTENDED RELEASE ORAL DAILY
Qty: 30 TABLET | Refills: 3 | Status: SHIPPED | OUTPATIENT
Start: 2024-05-22

## 2024-05-22 NOTE — ASSESSMENT & PLAN NOTE
Nonproductive cough, rhinorrhea, and fever x 4 days. Pt had negative at home COVID test today rapid flu in office negative, educate viral etiology. Start Allegra D, Delsym for cough and tylenol/ibuprofen prn for pain or fever. Advise seek immediate f/u for worsening sxs.

## 2024-05-22 NOTE — PROGRESS NOTES
Ambulatory Visit  Name: Kodi Jaquez      : 1960      MRN: [id number]  Encounter Provider: JACOB Giordano  Encounter Date: 2024   Encounter department: [facility] MEDICINE    Assessment & Plan   1. Upper respiratory tract infection, unspecified type  Assessment & Plan:  Nonproductive cough, rhinorrhea, and fever x 4 days. Pt had negative at home COVID test today rapid flu in office negative, educate viral etiology. Start Allegra D, Delsym for cough and tylenol/ibuprofen prn for pain or fever. Advise seek immediate f/u for worsening sxs.  Orders:  -     POCT rapid flu A and B  -     fexofenadine-pseudoephedrine (ALLEGRA-D 24) 180-240 MG per 24 hr tablet; Take 1 tablet by mouth daily       History of Present Illness     Pt with c/o non productive cough, rhinorrhea, fever and body aches x 4 days. Pt reports taking at home COVID test earlier this morning and was negative had fever as high as 100.1 yesterday. Pt has been taking Delsym x 2 days with effectiveness. Pt will be travelling over the weekend and wants to ensure not contagious. Will r/o flu counseled on strict  hand hygiene.        Review of Systems   Constitutional:  Positive for chills, fatigue and fever. Negative for activity change and appetite change.   HENT:  Positive for congestion, rhinorrhea and sneezing. Negative for ear discharge, ear pain, postnasal drip, sinus pressure, sinus pain, sore throat, trouble swallowing and voice change.    Respiratory:  Positive for cough. Negative for chest tightness, shortness of breath and wheezing.    Cardiovascular:  Negative for chest pain.   Gastrointestinal:  Negative for nausea and vomiting.   Musculoskeletal:  Positive for myalgias.   Allergic/Immunologic: Positive for environmental allergies.   Neurological:  Negative for dizziness, light-headedness and headaches.     Current Outpatient Medications on File Prior to Visit   Medication Sig Dispense Refill     "atorvastatin (LIPITOR) 10 mg tablet TAKE 1 TABLET DAILY 90 tablet 1    Cholecalciferol 50 MCG (2000 UT) CAPS Take 1 capsule by mouth daily      citalopram (CeleXA) 20 mg tablet Take 1 tablet (20 mg total) by mouth daily 30 tablet 5    febuxostat (ULORIC) 80 MG TABS TAKE 1 TABLET DAILY 90 tablet 3    levothyroxine 100 mcg tablet TAKE 1 TABLET DAILY 90 tablet 3    gabapentin (NEURONTIN) 300 mg capsule Take 1 capsule (300 mg total) by mouth 2 (two) times a day for 14 days 28 capsule 0     No current facility-administered medications on file prior to visit.      Objective     /73 (BP Location: Right arm, Patient Position: Sitting, Cuff Size: Large)   Pulse 72   Temp 97.9 °F (36.6 °C) (Tympanic)   Ht 5' 10\" (1.778 m)   Wt 91.5 kg (201 lb 12.8 oz)   SpO2 96%   BMI 28.96 kg/m²     Physical Exam  Vitals and nursing note reviewed.   Constitutional:       General: He is not in acute distress.     Appearance: Normal appearance. He is not ill-appearing, toxic-appearing or diaphoretic.   HENT:      Head: Normocephalic and atraumatic.      Right Ear: Tympanic membrane, ear canal and external ear normal. There is no impacted cerumen.      Left Ear: Tympanic membrane, ear canal and external ear normal. There is no impacted cerumen.      Nose: Rhinorrhea present. No congestion. Rhinorrhea is clear.      Right Turbinates: Pale.      Left Turbinates: Pale.      Right Sinus: No maxillary sinus tenderness or frontal sinus tenderness.      Left Sinus: No maxillary sinus tenderness or frontal sinus tenderness.      Mouth/Throat:      Mouth: Mucous membranes are moist.      Pharynx: Oropharynx is clear. No oropharyngeal exudate or posterior oropharyngeal erythema.   Eyes:      General: No scleral icterus.        Right eye: No discharge.         Left eye: No discharge.      Extraocular Movements: Extraocular movements intact.      Conjunctiva/sclera: Conjunctivae normal.      Pupils: Pupils are equal, round, and reactive to " light.   Neck:      Vascular: No carotid bruit.   Cardiovascular:      Rate and Rhythm: Normal rate and regular rhythm.      Pulses: Normal pulses.      Heart sounds: Normal heart sounds. No murmur heard.  Pulmonary:      Effort: Pulmonary effort is normal. No respiratory distress.      Breath sounds: Normal breath sounds. No stridor. No wheezing, rhonchi or rales.   Chest:      Chest wall: No tenderness.   Abdominal:      General: Bowel sounds are normal. There is no distension.      Palpations: Abdomen is soft. There is no mass.      Tenderness: There is no abdominal tenderness. There is no right CVA tenderness, left CVA tenderness, guarding or rebound.      Hernia: No hernia is present.   Musculoskeletal:         General: No swelling, tenderness, deformity or signs of injury. Normal range of motion.      Cervical back: Normal range of motion and neck supple. No rigidity or tenderness.      Right lower leg: No edema.      Left lower leg: No edema.   Lymphadenopathy:      Cervical: No cervical adenopathy.   Skin:     General: Skin is warm.      Capillary Refill: Capillary refill takes less than 2 seconds.      Findings: No erythema, lesion or rash.   Neurological:      General: No focal deficit present.      Mental Status: He is alert and oriented to person, place, and time.      Cranial Nerves: No cranial nerve deficit.      Sensory: No sensory deficit.      Motor: No weakness.      Coordination: Coordination normal.      Gait: Gait normal.      Deep Tendon Reflexes: Reflexes normal.   Psychiatric:         Mood and Affect: Mood normal.         Behavior: Behavior normal.         Thought Content: Thought content normal.         Judgment: Judgment normal.       Administrative Statements

## 2024-05-30 ENCOUNTER — OFFICE VISIT (OUTPATIENT)
Dept: FAMILY MEDICINE CLINIC | Facility: CLINIC | Age: 64
End: 2024-05-30
Payer: COMMERCIAL

## 2024-05-30 VITALS
DIASTOLIC BLOOD PRESSURE: 72 MMHG | SYSTOLIC BLOOD PRESSURE: 126 MMHG | TEMPERATURE: 97.2 F | WEIGHT: 202.6 LBS | HEART RATE: 80 BPM | OXYGEN SATURATION: 98 % | BODY MASS INDEX: 29.07 KG/M2

## 2024-05-30 DIAGNOSIS — M1A.0710 IDIOPATHIC CHRONIC GOUT OF RIGHT FOOT WITHOUT TOPHUS: ICD-10-CM

## 2024-05-30 DIAGNOSIS — E55.9 VITAMIN D DEFICIENCY: ICD-10-CM

## 2024-05-30 DIAGNOSIS — R73.01 IMPAIRED FASTING GLUCOSE: ICD-10-CM

## 2024-05-30 DIAGNOSIS — F41.9 ANXIETY DISORDER, UNSPECIFIED TYPE: ICD-10-CM

## 2024-05-30 DIAGNOSIS — G62.9 SMALL FIBER NEUROPATHY: ICD-10-CM

## 2024-05-30 DIAGNOSIS — E78.2 MIXED HYPERLIPIDEMIA: Primary | ICD-10-CM

## 2024-05-30 DIAGNOSIS — E03.9 ACQUIRED HYPOTHYROIDISM: ICD-10-CM

## 2024-05-30 PROBLEM — J06.9 UPPER RESPIRATORY TRACT INFECTION: Status: RESOLVED | Noted: 2024-05-22 | Resolved: 2024-05-30

## 2024-05-30 PROCEDURE — 99214 OFFICE O/P EST MOD 30 MIN: CPT | Performed by: FAMILY MEDICINE

## 2024-05-30 NOTE — PROGRESS NOTES
Ambulatory Visit  Name: Kodi Jaquez      : 1960      MRN: [id number]  Encounter Provider: Norris Mensah MD  Encounter Date: 2024   Encounter department: [facility]    Assessment & Plan   1. Mixed hyperlipidemia  Assessment & Plan:  Recheck lipids. Continue atorvastatin 10 mg qhs. Advised pt to follow a low cholesterol diet and to exercise on a regular basis.   Orders:  -     Lipid panel; Future  2. Acquired hypothyroidism  Assessment & Plan:  Recheck TSH and Free T4. Continue levothyroxine 100 mcg qd.   Orders:  -     TSH, 3rd generation; Future  -     T4, free; Future  3. Impaired fasting glucose  Assessment & Plan:  A1C 5.3 in 2024.  4. Small fiber neuropathy  Assessment & Plan:  Has been stable on gabapentin 300 bid.  5. Idiopathic chronic gout of right foot without tophus  Assessment & Plan:  No recent episodes. Continue uloric 80 mg daily and recheck uric acid. Saw Rheumatology in 2024.   6. Vitamin D deficiency  Assessment & Plan:  Recheck level. Continue 2000 U qd.   Orders:  -     Vitamin D 25 hydroxy; Future  7. Anxiety disorder, unspecified type  Assessment & Plan:  Stress level ok. Continue citalopram 20 mg qd.       Depression Screening and Follow-up Plan: Patient was screened for depression during today's encounter. They screened negative with a PHQ-2 score of 0.        History of Present Illness     Patient here for follow-up Hyperlipidemia, Hypothyroidism, Impaired Fasting Glucose, Neuropathy, Gout, Vitamin D deficiency, Anxiety. Doing ok. No chest pain or shortness of breath. No headaches. No abdominal pain. No regular exercise. No recent gout. No change in neuropathy. Takes 2000 U of Vitamin D daily. Stress level has been ok. Saw Rheumatology in 2024 and next appointment is in 2024.       Review of Systems   Constitutional:  Negative for activity change, appetite change and fatigue.   Respiratory:  Negative for chest tightness  "and shortness of breath.    Cardiovascular:  Negative for chest pain and palpitations.   Gastrointestinal:  Negative for abdominal pain, diarrhea, nausea and vomiting.   Neurological:  Positive for numbness. Negative for dizziness, tremors, light-headedness and headaches.   Psychiatric/Behavioral:  Negative for agitation, decreased concentration, dysphoric mood, self-injury, sleep disturbance and suicidal ideas. The patient is not nervous/anxious.      Past Medical History:   Diagnosis Date   • Anxiety 9/15/2022   • Disease of thyroid gland     hypo   • Gout    • Hyperlipidemia    • Hypothyroidism    • Neuropathy    • Osteoarthritis    • Plantar fasciitis of right foot    • Schwannoma      Past Surgical History:   Procedure Laterality Date   • TONSILLECTOMY       Family History   Problem Relation Age of Onset   • Diabetes Mother    • Emphysema Mother    • Arthritis Mother    • Lung cancer Father    • Cancer Father    • Heart disease Sister    • Diabetes Sister    • No Known Problems Son    • Heart disease Maternal Grandfather    • Diabetes Paternal Grandmother    • Gout Family    • Diabetes Family    • Cancer Family    • Hypertension Family    • Heart disease Family      Social History     Tobacco Use   • Smoking status: Never   • Smokeless tobacco: Never   Vaping Use   • Vaping status: Never Used   Substance and Sexual Activity   • Alcohol use: Yes     Comment: \"infrequent\"   • Drug use: No   • Sexual activity: Yes     Partners: Female     Birth control/protection: None     Current Outpatient Medications on File Prior to Visit   Medication Sig   • atorvastatin (LIPITOR) 10 mg tablet TAKE 1 TABLET DAILY   • Cholecalciferol 50 MCG (2000 UT) CAPS Take 1 capsule by mouth daily   • citalopram (CeleXA) 20 mg tablet Take 1 tablet (20 mg total) by mouth daily   • febuxostat (ULORIC) 80 MG TABS TAKE 1 TABLET DAILY   • gabapentin (NEURONTIN) 300 mg capsule Take 1 capsule (300 mg total) by mouth 2 (two) times a day for 14 " "days   • levothyroxine 100 mcg tablet TAKE 1 TABLET DAILY   • fexofenadine-pseudoephedrine (ALLEGRA-D 24) 180-240 MG per 24 hr tablet Take 1 tablet by mouth daily (Patient not taking: Reported on 5/30/2024)     Allergies   Allergen Reactions   • Penicillin V Hives     Unknown   • Penicillins      \"and other cillins\"     Immunization History   Administered Date(s) Administered   • COVID-19 MODERNA VACC 0.5 ML IM 01/06/2022   • INFLUENZA 11/08/2020, 11/07/2021, 11/06/2022, 11/05/2023   • Tdap 08/26/2022   • Zoster Vaccine Recombinant 08/26/2022, 11/18/2022     Objective     /72 (BP Location: Left arm, Patient Position: Sitting, Cuff Size: Standard)   Pulse 80   Temp (!) 97.2 °F (36.2 °C) (Tympanic)   Wt 91.9 kg (202 lb 9.6 oz)   SpO2 98%   BMI 29.07 kg/m²     Physical Exam  Vitals and nursing note reviewed.   Constitutional:       Appearance: Normal appearance. He is normal weight.   Neck:      Vascular: No carotid bruit.   Cardiovascular:      Rate and Rhythm: Normal rate and regular rhythm.      Heart sounds: Normal heart sounds. No murmur heard.  Pulmonary:      Effort: Pulmonary effort is normal.      Breath sounds: Normal breath sounds. No wheezing.   Musculoskeletal:      Cervical back: Normal range of motion and neck supple. No muscular tenderness.      Right lower leg: No edema.      Left lower leg: No edema.   Lymphadenopathy:      Cervical: No cervical adenopathy.   Neurological:      Mental Status: He is alert.   Psychiatric:         Mood and Affect: Mood normal.         Behavior: Behavior normal.         Thought Content: Thought content normal.         Judgment: Judgment normal.       Administrative Statements     "

## 2024-05-30 NOTE — ASSESSMENT & PLAN NOTE
No recent episodes. Continue uloric 80 mg daily and recheck uric acid. Saw Rheumatology in Feb 2024.

## 2024-05-30 NOTE — ASSESSMENT & PLAN NOTE
Recheck lipids. Continue atorvastatin 10 mg qhs. Advised pt to follow a low cholesterol diet and to exercise on a regular basis.

## 2024-06-26 DIAGNOSIS — Z76.0 MEDICATION REFILL: ICD-10-CM

## 2024-06-26 RX ORDER — GABAPENTIN 300 MG/1
CAPSULE ORAL
Qty: 28 CAPSULE | Refills: 0 | Status: SHIPPED | OUTPATIENT
Start: 2024-06-26

## 2024-08-29 ENCOUNTER — OFFICE VISIT (OUTPATIENT)
Age: 64
End: 2024-08-29
Payer: COMMERCIAL

## 2024-08-29 VITALS
WEIGHT: 203.2 LBS | HEART RATE: 67 BPM | HEIGHT: 70 IN | BODY MASS INDEX: 29.09 KG/M2 | SYSTOLIC BLOOD PRESSURE: 138 MMHG | OXYGEN SATURATION: 96 % | DIASTOLIC BLOOD PRESSURE: 72 MMHG | TEMPERATURE: 97 F

## 2024-08-29 DIAGNOSIS — M1A.0710 IDIOPATHIC CHRONIC GOUT OF RIGHT FOOT WITHOUT TOPHUS: ICD-10-CM

## 2024-08-29 DIAGNOSIS — M54.16 CHRONIC LUMBAR RADICULOPATHY: ICD-10-CM

## 2024-08-29 DIAGNOSIS — M15.0 PRIMARY GENERALIZED (OSTEO)ARTHRITIS: ICD-10-CM

## 2024-08-29 DIAGNOSIS — M72.2 PLANTAR FASCIITIS, RIGHT: ICD-10-CM

## 2024-08-29 DIAGNOSIS — L40.0 PLAQUE PSORIASIS: ICD-10-CM

## 2024-08-29 DIAGNOSIS — G62.9 SMALL FIBER NEUROPATHY: ICD-10-CM

## 2024-08-29 DIAGNOSIS — M47.816 LUMBAR SPONDYLOSIS: ICD-10-CM

## 2024-08-29 DIAGNOSIS — M1A.09X0 IDIOPATHIC CHRONIC GOUT OF MULTIPLE SITES WITHOUT TOPHUS: Primary | ICD-10-CM

## 2024-08-29 PROCEDURE — 99214 OFFICE O/P EST MOD 30 MIN: CPT | Performed by: INTERNAL MEDICINE

## 2024-08-29 NOTE — ASSESSMENT & PLAN NOTE
Interphalangeal osteoarthritis hands and feet with intermittent left thumb arthralgias.  Lumbar spondylosis with no current radicular symptoms. X-rays lumbar spine from July 2022 significant for mild degenerative changes L5-S1.  SI joints no inflammatory/erosive changes, however mild degenerative changes noted.  No current knee arthralgias.  Continue home exercise and stretching program.  Monitor labs for medication toxicity.

## 2024-08-29 NOTE — ASSESSMENT & PLAN NOTE
No current radicular symptoms. I reviewed lumbar spine x-ray dated 7/16/2022 which was significant for mild degenerative disc disease.  Mild SI joint degenerative changes noted.  Encourage home exercise program as tolerated.  Continue to monitor.

## 2024-08-29 NOTE — PROGRESS NOTES
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  Continue to monitor serum uric acid to make sure he is in target range of less than 6.0.  Labs from last week are not available for review at the time of the visit. Most recent serum uric acid was 3.2 on 1/31/2024.  Check CMP to rule out medication toxicity.  Prescription was given for lab work.  Plan follow-up 6 months.    Primary generalized (osteo)arthritis  Interphalangeal osteoarthritis hands and feet with intermittent left thumb arthralgias.  Lumbar spondylosis with no current radicular symptoms. X-rays lumbar spine from July 2022 significant for mild degenerative changes L5-S1.  SI joints no inflammatory/erosive changes, however mild degenerative changes noted.  No current knee arthralgias.  Continue home exercise and stretching program.  Monitor labs for medication toxicity.    Chronic lumbar radiculopathy  No current radicular symptoms. I reviewed lumbar spine x-ray dated 7/16/2022 which was significant for mild degenerative disc disease.  Mild SI joint degenerative changes noted.  Encourage home exercise program as tolerated.  Continue to monitor.    Plaque psoriasis  History plaque psoriasis with extensor surface  elbow lesions currently quiescent.  Rule out psoriatic arthritis in view of history of right plantar fasciitis.  Lumbar spine films from July 2022 with no evidence of axial disease but significant for degenerative changes L5-S1.  SI joints mild degenerative changes with no inflammatory changes. Monitor labs for disease activity and medication toxicity.  Follow-up 6 months or earlier if needed.    Plantar fasciitis, right  Continue heel stretching more consistently. Mild tenderness right plantar fascia with palpation but no clinical sx's.  Encouraged continued use of appropriate shoe gear with orthotics, and avoid walking barefoot. Follow-up 6 months.    Lumbar spondylosis  Lumbar  spondylosis as noted prior films with no current radicular symptoms.  Encourage home exercise program as tolerated.  Continue to monitor.    Small fiber neuropathy  Small fiber neuropathy generally stable with gabapentin.  Follow-up neurology.  Monitor labs for medication toxicity.         Problem List Items Addressed This Visit       Primary generalized (osteo)arthritis    Interphalangeal osteoarthritis hands and feet with intermittent left thumb arthralgias.  Lumbar spondylosis with no current radicular symptoms. X-rays lumbar spine from July 2022 significant for mild degenerative changes L5-S1.  SI joints no inflammatory/erosive changes, however mild degenerative changes noted.  No current knee arthralgias.  Continue home exercise and stretching program.  Monitor labs for medication toxicity.         Lumbar spondylosis    Lumbar spondylosis as noted prior films with no current radicular symptoms.  Encourage home exercise program as tolerated.  Continue to monitor.         Small fiber neuropathy    Small fiber neuropathy generally stable with gabapentin.  Follow-up neurology.  Monitor labs for medication toxicity.         Chronic lumbar radiculopathy    No current radicular symptoms. I reviewed lumbar spine x-ray dated 7/16/2022 which was significant for mild degenerative disc disease.  Mild SI joint degenerative changes noted.  Encourage home exercise program as tolerated.  Continue to monitor.         Idiopathic chronic gout of right foot without tophus    Gout quiescent on Uloric 80 mg daily.  Continue to monitor serum uric acid to make sure he is in target range of less than 6.0.  Labs from last week are not available for review at the time of the visit. Most recent serum uric acid was 3.2 on 1/31/2024.  Check CMP to rule out medication toxicity.  Prescription was given for lab work.  Plan follow-up 6 months.         Plaque psoriasis    History plaque psoriasis with extensor surface  elbow lesions currently  quiescent.  Rule out psoriatic arthritis in view of history of right plantar fasciitis.  Lumbar spine films from July 2022 with no evidence of axial disease but significant for degenerative changes L5-S1.  SI joints mild degenerative changes with no inflammatory changes. Monitor labs for disease activity and medication toxicity.  Follow-up 6 months or earlier if needed.         Plantar fasciitis, right    Continue heel stretching more consistently. Mild tenderness right plantar fascia with palpation but no clinical sx's.  Encouraged continued use of appropriate shoe gear with orthotics, and avoid walking barefoot. Follow-up 6 months.          Other Visit Diagnoses         Idiopathic chronic gout of multiple sites without tophus    -  Primary    Relevant Orders    C-reactive protein    Sedimentation rate, automated    CBC and differential    Comprehensive metabolic panel    Urinalysis with microscopic    Uric acid                Reviewed records, labs, and imaging with the patient in detail.  Counseled patient.  Discussion regarding my findings and recommendations.  Office visit with documentation 35 min.    Subjective:      Patient ID: Kodi Jaquez is a 64 y.o. male.    Patient with history of gout quiescent on Uloric, with history of primary generalized osteoarthritis with interphalangeal osteoarthritis hands and feet, and lumbar spondylosis, who presents for follow up.  Other medical problems include history plaque psoriasis generally quiescent, small fiber neuropathy, primary hypothyroidism, hyperlipidemia, history of T2-3 schwannoma, and mechanical foot deformities.  Patient has chronic dysesthesias bilateral feet.  He had been diagnosed with plantar fasciitis right foot, and was treated with injection and orthotics by Podiatry June 2022. He has not been consistent with home stretches, and has intermittent symptoms.  Back and hip symptoms improved and he has not had any radicular symptoms right lower  "extremity. Morning stiffness is seconds duration.  He has left thumb arthralgias depending on activity level with his thumb.  Plaque psoriasis is quiescent however he does have dry patches on his extensor elbow surfaces.  Patient had colonoscopy October 7, 2022 with 2 tubular adenomas, one in the transverse colon, one in the descending colon.  Review of systems otherwise unremarkable.    Lab work from this week is not available for review at the time of the visit.  Review of lab work dated 1/31/2024 significant for white blood cell count 4.3 with absolute neutrophil count 1.9.  Platelets 196.  Glucose 120.  Creatinine 1.5 with estimated GFR 72.  CRP less than 1.0.  Sed rate 1.  Uric acid in target range at 3.2.  Hemoglobin A1c 5.3.  PSA 1.23.  Vitamin D 33.    Review of x-rays dated 7/16/2022 reveal LS-spine with mild degenerative disc disease.  Mild SI degenerative changes.  Right foot small calcaneal spur.        Allergies  Allergies   Allergen Reactions    Penicillin V Hives     Unknown    Penicillins      \"and other cillins\"       Home Medications    Current Outpatient Medications:     Cholecalciferol 50 MCG (2000 UT) CAPS, Take 1 capsule by mouth daily, Disp: , Rfl:     febuxostat (ULORIC) 80 MG TABS, TAKE 1 TABLET DAILY, Disp: 90 tablet, Rfl: 3    gabapentin (NEURONTIN) 300 mg capsule, TAKE 1 CAPSULE(300 MG) BY MOUTH TWICE DAILY FOR 14 DAYS, Disp: 28 capsule, Rfl: 0    atorvastatin (LIPITOR) 10 mg tablet, TAKE 1 TABLET DAILY, Disp: 90 tablet, Rfl: 0    citalopram (CeleXA) 20 mg tablet, TAKE 1 TABLET(20 MG) BY MOUTH DAILY, Disp: 30 tablet, Rfl: 5    fexofenadine-pseudoephedrine (ALLEGRA-D 24) 180-240 MG per 24 hr tablet, Take 1 tablet by mouth daily (Patient not taking: Reported on 5/30/2024), Disp: 30 tablet, Rfl: 3    levothyroxine 100 mcg tablet, Take 1 tablet (100 mcg total) by mouth daily, Disp: 90 tablet, Rfl: 1    levothyroxine 100 mcg tablet, Take 1 tablet (100 mcg total) by mouth daily, Disp: 30 " tablet, Rfl: 0    Past Medical History  Past Medical History:   Diagnosis Date    Anxiety 9/15/2022    Disease of thyroid gland     hypo    Gout     Hyperlipidemia     Hypothyroidism     Neuropathy     Osteoarthritis     Plantar fasciitis of right foot     Schwannoma        Past Surgical History   Past Surgical History:   Procedure Laterality Date    TONSILLECTOMY         Family History   Family History   Problem Relation Age of Onset    Diabetes Mother     Emphysema Mother     Arthritis Mother     Lung cancer Father     Cancer Father     Heart disease Sister     Diabetes Sister     No Known Problems Son     Heart disease Maternal Grandfather     Diabetes Paternal Grandmother     Gout Family     Diabetes Family     Cancer Family     Hypertension Family     Heart disease Family        The following portions of the patient's history were reviewed and updated as appropriate: allergies, current medications, past family history, past medical history, past social history, past surgical history, and problem list.    Review of Systems   Constitutional:  Negative for chills and fever.   HENT:  Negative for ear pain and sore throat.    Eyes:  Negative for pain and visual disturbance.   Respiratory:  Negative for cough and shortness of breath.    Cardiovascular:  Negative for chest pain and palpitations.   Gastrointestinal:  Negative for abdominal pain and vomiting.   Genitourinary:  Negative for dysuria and hematuria.   Musculoskeletal:  Positive for arthralgias and back pain.        History right plantar fasciitis. Plantar fascia stable with using orthotics. Rare left thumb arthralgias. Am gel seconds.   Skin:  Positive for rash. Negative for color change.        Left elbow extensor surface pruritic rash resolved.   Neurological:  Positive for numbness. Negative for seizures and syncope.        Dysesthesias ankles/feet  Balance difficulty   All other systems reviewed and are negative.        Objective:      /72 (BP  "Location: Right arm, Patient Position: Sitting, Cuff Size: Adult)   Pulse 67   Temp (!) 97 °F (36.1 °C) (Temporal)   Ht 5' 10\" (1.778 m)   Wt 92.2 kg (203 lb 3.2 oz)   SpO2 96%   BMI 29.16 kg/m²          Physical Exam  Vitals reviewed.   Constitutional:       Appearance: Normal appearance.   HENT:      Head: Normocephalic.      Nose:      Comments: Nose and throat unremarkable  Cardiovascular:      Rate and Rhythm: Regular rhythm.   Pulmonary:      Breath sounds: Normal breath sounds.   Abdominal:      Palpations: Abdomen is soft.   Musculoskeletal:      Comments: Neck slightly decreased lateral flexion.  Shoulders, elbows, and wrists full range of motion.  Tinel's negative bilaterally.  Hands squaring 1st carpometacarpal joints bilaterally with Heberden's and early Brandi's nodes.  No synovitis noted.  Straight leg raising negative bilaterally.  Marked spasm noted lumbosacral paraspinals. Hips full range of motion.  Knees full range of motion with patellofemoral crepitus. Ankles full range of motion.  Feet rearfoot hyperpronation with midfoot cavus deformities, hallux valgus deformities left greater than right, early tailor's bunions, and tenderness along the right plantar fascia insertion. Tight heel cords.   Skin:     Comments: No psoriasis. Slight dry patches extensor surface elbows right>left.  No onycholysis.  No nail pitting.               This note was written in part using the assistance of the [employer] [id number] microphone system. Those portions using this system have been dictated and not read.  "

## 2024-08-29 NOTE — ASSESSMENT & PLAN NOTE
Gout quiescent on Uloric 80 mg daily.  Continue to monitor serum uric acid to make sure he is in target range of less than 6.0.  Labs from last week are not available for review at the time of the visit. Most recent serum uric acid was 3.2 on 1/31/2024.  Check CMP to rule out medication toxicity.  Prescription was given for lab work.  Plan follow-up 6 months.

## 2024-08-29 NOTE — ASSESSMENT & PLAN NOTE
History plaque psoriasis with extensor surface  elbow lesions currently quiescent.  Rule out psoriatic arthritis in view of history of right plantar fasciitis.  Lumbar spine films from July 2022 with no evidence of axial disease but significant for degenerative changes L5-S1.  SI joints mild degenerative changes with no inflammatory changes. Monitor labs for disease activity and medication toxicity.  Follow-up 6 months or earlier if needed.

## 2024-08-29 NOTE — PATIENT INSTRUCTIONS
I would encourage you to do stretches both for your back and heel cords.  You can use moist heat on your back before stretching to get a better stretch.  Get lab work 2 weeks before the next office visit.  Continue Uloric.  Continue gabapentin.

## 2024-09-10 NOTE — ASSESSMENT & PLAN NOTE
Continue heel stretching more consistently. Some tenderness right plantar fascia with palpation but no clinical sx's.  Encouraged continued use of appropriate shoe gear with orthotics, and avoid walking barefoot. Follow-up 6 months.  
Gout quiescent on Uloric 80 mg daily.  Continue to monitor serum uric acid to make sure he is in target range of less than 6.0.  Most recent serum uric acid was 3.2 on 1/31/2024.  Check CMP to rule out medication toxicity.  Prescription was given for lab work.  Plan follow-up 6 months.  
History plaque psoriasis with extensor surface  elbow lesions currently quiescent.  Rule out psoriatic arthritis in view of history of right plantar fasciitis.  Lumbar spine films from July 2022 significant for degenerative changes L5-S1 with no evidence for axial inflammatory disease.  SI joints mild degenerative changes with no inflammatory changes.  No evidence for spondylarthritis. Monitor labs for disease activity and medication toxicity.  Follow-up 6 months or earlier if needed.  
Interphalangeal osteoarthritis hands and feet with occasional left thumb arthralgias.  Lumbar spondylosis with no current complaints including radicular symptoms.  He has been walking his dog on a daily basis without difficulty.  X-rays lumbar spine from July 2022 significant for mild degenerative changes L5-S1.  SI joints no inflammatory changes, however mild degenerative changes noted.  No current knee arthralgias.  Continue home exercise and stretching program.  Monitor labs for medication toxicity.  
Lumbar spondylosis on plain films with no current radicular symptoms.  Encourage home exercise program as tolerated.  Continue to monitor.  
No current radicular symptoms.  Review of lumbar spine x-ray dated 7/16/2022 significant for mild degenerative disc disease.  Mild SI joint degenerative changes noted.  Encourage home exercise program as tolerated.  Continue to monitor.  
Small fiber neuropathy generally stable with gabapentin.  Follow-up neurology.  Monitor labs for medication toxicity.  
Unknown

## 2024-10-07 DIAGNOSIS — E03.9 ACQUIRED HYPOTHYROIDISM: ICD-10-CM

## 2024-10-08 RX ORDER — LEVOTHYROXINE SODIUM 100 UG/1
100 TABLET ORAL DAILY
Qty: 30 TABLET | Refills: 0 | Status: SHIPPED | OUTPATIENT
Start: 2024-10-08

## 2024-10-14 DIAGNOSIS — E78.2 MIXED HYPERLIPIDEMIA: ICD-10-CM

## 2024-10-15 RX ORDER — ATORVASTATIN CALCIUM 10 MG/1
10 TABLET, FILM COATED ORAL DAILY
Qty: 90 TABLET | Refills: 0 | Status: SHIPPED | OUTPATIENT
Start: 2024-10-15

## 2024-11-04 DIAGNOSIS — E03.9 ACQUIRED HYPOTHYROIDISM: ICD-10-CM

## 2024-11-04 RX ORDER — LEVOTHYROXINE SODIUM 100 UG/1
100 TABLET ORAL DAILY
Qty: 90 TABLET | Refills: 1 | Status: SHIPPED | OUTPATIENT
Start: 2024-11-04

## 2024-11-04 RX ORDER — LEVOTHYROXINE SODIUM 100 UG/1
100 TABLET ORAL DAILY
Qty: 30 TABLET | Refills: 0 | Status: SHIPPED | OUTPATIENT
Start: 2024-11-04

## 2024-11-04 RX ORDER — LEVOTHYROXINE SODIUM 100 UG/1
100 TABLET ORAL
Qty: 100 TABLET | Refills: 3 | Status: CANCELLED | OUTPATIENT
Start: 2024-11-04

## 2024-11-04 NOTE — TELEPHONE ENCOUNTER
Reason for call: Multiple Pharmacy    [x] Refill   [] Prior Auth  [] Other:     Office:   [x] PCP/Provider - Dr Man  [] Specialty/Provider -     Medication: levothyroxine    Dose/Frequency: 100 mg daily     Quantity:  90 - [facility]     30 - [facility]     Pharmacy: Multiple     Does the patient have enough for 3 days?   [] Yes   [x] No - Send as HP to [facility]

## 2024-11-24 DIAGNOSIS — F41.9 ANXIETY DISORDER, UNSPECIFIED TYPE: ICD-10-CM

## 2024-11-25 RX ORDER — CITALOPRAM HYDROBROMIDE 20 MG/1
20 TABLET ORAL DAILY
Qty: 30 TABLET | Refills: 5 | Status: SHIPPED | OUTPATIENT
Start: 2024-11-25

## 2024-11-29 NOTE — ASSESSMENT & PLAN NOTE
Continue heel stretching more consistently. Mild tenderness right plantar fascia with palpation but no clinical sx's.  Encouraged continued use of appropriate shoe gear with orthotics, and avoid walking barefoot. Follow-up 6 months.

## 2024-11-29 NOTE — ASSESSMENT & PLAN NOTE
Patient: Nerissa Valentin    Procedure(s):  COLONOSCOPY, WITH POLYPECTOMY AND BIOPSY  ESOPHAGOGASTRODUODENOSCOPY, WITH BIOPSY    Diagnosis: Anemia, unspecified type [D64.9]  Diagnosis Additional Information: No value filed.    Anesthesia Type:   MAC     Note:    Oropharynx: spontaneously breathing  Level of Consciousness: drowsy  Oxygen Supplementation: room air    Independent Airway: airway patency satisfactory and stable  Dentition: dentition changed  Vital Signs Stable: post-procedure vital signs reviewed and stable  Report to RN Given: handoff report given  Patient transferred to: Phase II    Handoff Report: Identifed the Patient, Identified the Reponsible Provider, Reviewed the pertinent medical history, Discussed the surgical course, Reviewed Intra-OP anesthesia mangement and issues during anesthesia, Set expectations for post-procedure period and Allowed opportunity for questions and acknowledgement of understanding      Vitals: (Last set prior to Anesthesia Care Transfer)  CRNA VITALS  3/8/2021 1219 - 3/8/2021 1255      3/8/2021             Pulse:  91    Ht Rate:  91    SpO2:  99 %      CRNA VITALS  3/8/2021 1225 - 3/8/2021 1255      3/8/2021             Pulse:  91    Ht Rate:  91    SpO2:  99 %        Electronically Signed By: RICCI Villa CRNA  March 8, 2021  12:55 PM   Small fiber neuropathy generally stable with gabapentin.  Follow-up neurology.  Monitor labs for medication toxicity.

## 2024-11-29 NOTE — ASSESSMENT & PLAN NOTE
Lumbar spondylosis as noted prior films with no current radicular symptoms.  Encourage home exercise program as tolerated.  Continue to monitor.

## 2024-12-03 NOTE — ASSESSMENT & PLAN NOTE
Needs to recheck TSH and Free T4. Continue levothyroxine 100 mcg qd.     Orders:    TSH, 3rd generation; Future    T4, free; Future

## 2024-12-03 NOTE — ASSESSMENT & PLAN NOTE
Needs to recheck lipids. Continue atorvastatin 10 mg qhs. Advised pt to follow a low cholesterol diet and to exercise on a regular basis.     Orders:    Lipid panel; Future    Comprehensive metabolic panel; Future

## 2024-12-03 NOTE — ASSESSMENT & PLAN NOTE
No recent episodes. Continue uloric 80 mg daily and recheck uric acid. Saw Rheumatology in August 2024.

## 2024-12-04 ENCOUNTER — OFFICE VISIT (OUTPATIENT)
Dept: FAMILY MEDICINE CLINIC | Facility: CLINIC | Age: 64
End: 2024-12-04
Payer: COMMERCIAL

## 2024-12-04 VITALS
DIASTOLIC BLOOD PRESSURE: 80 MMHG | HEIGHT: 70 IN | BODY MASS INDEX: 29.2 KG/M2 | OXYGEN SATURATION: 97 % | SYSTOLIC BLOOD PRESSURE: 136 MMHG | HEART RATE: 80 BPM | RESPIRATION RATE: 16 BRPM | WEIGHT: 204 LBS

## 2024-12-04 DIAGNOSIS — E55.9 VITAMIN D DEFICIENCY: ICD-10-CM

## 2024-12-04 DIAGNOSIS — M1A.0710 IDIOPATHIC CHRONIC GOUT OF RIGHT FOOT WITHOUT TOPHUS: ICD-10-CM

## 2024-12-04 DIAGNOSIS — F41.9 ANXIETY DISORDER, UNSPECIFIED TYPE: ICD-10-CM

## 2024-12-04 DIAGNOSIS — E03.9 ACQUIRED HYPOTHYROIDISM: ICD-10-CM

## 2024-12-04 DIAGNOSIS — E78.2 MIXED HYPERLIPIDEMIA: Primary | ICD-10-CM

## 2024-12-04 DIAGNOSIS — G62.9 SMALL FIBER NEUROPATHY: ICD-10-CM

## 2024-12-04 PROCEDURE — 99214 OFFICE O/P EST MOD 30 MIN: CPT | Performed by: FAMILY MEDICINE

## 2024-12-04 NOTE — PROGRESS NOTES
Name: Kodi Jaquez      : 1960      MRN: [id number]  Encounter Provider: Norris Mensah MD  Encounter Date: 2024   Encounter department: [facility]    Assessment & Plan  Mixed hyperlipidemia  Needs to recheck lipids. Continue atorvastatin 10 mg qhs. Advised pt to follow a low cholesterol diet and to exercise on a regular basis.     Orders:    Lipid panel; Future    Comprehensive metabolic panel; Future    Acquired hypothyroidism  Needs to recheck TSH and Free T4. Continue levothyroxine 100 mcg qd.     Orders:    TSH, 3rd generation; Future    T4, free; Future    Small fiber neuropathy  Stable. Continue gabapentin 300 bid.         Idiopathic chronic gout of right foot without tophus  No recent episodes. Continue uloric 80 mg daily and recheck uric acid. Saw Rheumatology in 2024.          Vitamin D deficiency  Recheck level. Continue 2000 U qd.     Orders:    Vitamin D 25 hydroxy; Future    Anxiety disorder, unspecified type  Stress level ok. Continue citalopram 20 mg qd.               History of Present Illness     Patient here for follow-up Hyperlipidemia, Hypothyroidism, Gout, Vitamin D deficiency, Anxiety. Doing well. No chest pain or shortness of breath. No headaches. No abdominal pain. Stress level was higher but better now. No recent gout. Had flu shot. No new complaints.       Review of Systems   Constitutional:  Negative for activity change, appetite change and fatigue.   Respiratory:  Negative for chest tightness and shortness of breath.    Cardiovascular:  Negative for chest pain and palpitations.   Gastrointestinal:  Negative for abdominal pain, diarrhea, nausea and vomiting.   Neurological:  Positive for numbness. Negative for dizziness, tremors, light-headedness and headaches.   Psychiatric/Behavioral:  Negative for agitation, decreased concentration, dysphoric mood, self-injury, sleep disturbance and suicidal ideas. The patient is not nervous/anxious.   "    Past Medical History:   Diagnosis Date    Anxiety 9/15/2022    Disease of thyroid gland     hypo    Gout     Hyperlipidemia     Hypothyroidism     Neuropathy     Osteoarthritis     Plantar fasciitis of right foot     Schwannoma      Past Surgical History:   Procedure Laterality Date    TONSILLECTOMY       Family History   Problem Relation Age of Onset    Diabetes Mother     Emphysema Mother     Arthritis Mother     Lung cancer Father     Cancer Father     Heart disease Sister     Diabetes Sister     No Known Problems Son     Heart disease Maternal Grandfather     Diabetes Paternal Grandmother     Gout Family     Diabetes Family     Cancer Family     Hypertension Family     Heart disease Family      Social History     Tobacco Use    Smoking status: Never    Smokeless tobacco: Never   Vaping Use    Vaping status: Never Used   Substance and Sexual Activity    Alcohol use: Yes     Comment: \"infrequent\"    Drug use: No    Sexual activity: Yes     Partners: Female     Birth control/protection: None     Current Outpatient Medications on File Prior to Visit   Medication Sig    atorvastatin (LIPITOR) 10 mg tablet TAKE 1 TABLET DAILY    Cholecalciferol 50 MCG (2000 UT) CAPS Take 1 capsule by mouth daily    citalopram (CeleXA) 20 mg tablet TAKE 1 TABLET(20 MG) BY MOUTH DAILY    febuxostat (ULORIC) 80 MG TABS TAKE 1 TABLET DAILY    gabapentin (NEURONTIN) 300 mg capsule TAKE 1 CAPSULE(300 MG) BY MOUTH TWICE DAILY FOR 14 DAYS    levothyroxine 100 mcg tablet Take 1 tablet (100 mcg total) by mouth daily    fexofenadine-pseudoephedrine (ALLEGRA-D 24) 180-240 MG per 24 hr tablet Take 1 tablet by mouth daily (Patient not taking: Reported on 12/4/2024)    levothyroxine 100 mcg tablet Take 1 tablet (100 mcg total) by mouth daily (Patient not taking: Reported on 12/4/2024)     Allergies   Allergen Reactions    Penicillin V Hives     Unknown    Penicillins      \"and other cillins\"     Immunization History   Administered Date(s) " "Administered    COVID-19 MODERNA VACC 0.5 ML IM 01/06/2022    INFLUENZA 11/08/2020, 11/07/2021, 11/06/2022, 11/05/2023    Influenza, seasonal, injectable, preservative free 11/03/2024    Tdap 08/26/2022    Zoster Vaccine Recombinant 08/26/2022, 11/18/2022     Objective   /80 (BP Location: Left arm, Patient Position: Sitting, Cuff Size: Standard)   Pulse 80   Resp 16   Ht 5' 10\" (1.778 m)   Wt 92.5 kg (204 lb)   SpO2 97%   BMI 29.27 kg/m²     Physical Exam  Vitals and nursing note reviewed.   Constitutional:       Appearance: Normal appearance. He is normal weight.   Neck:      Vascular: No carotid bruit.   Cardiovascular:      Rate and Rhythm: Normal rate and regular rhythm.      Heart sounds: Normal heart sounds. No murmur heard.  Pulmonary:      Effort: Pulmonary effort is normal.      Breath sounds: Normal breath sounds. No wheezing.   Musculoskeletal:      Cervical back: Normal range of motion and neck supple. No muscular tenderness.      Right lower leg: No edema.      Left lower leg: No edema.   Lymphadenopathy:      Cervical: No cervical adenopathy.   Neurological:      Mental Status: He is alert.   Psychiatric:         Mood and Affect: Mood normal.         Behavior: Behavior normal.         Thought Content: Thought content normal.         Judgment: Judgment normal.         "

## 2024-12-06 LAB
25(OH)D3+25(OH)D2 SERPL-MCNC: 40 NG/ML (ref 30–100)
ALBUMIN SERPL-MCNC: 4.5 G/DL (ref 3.5–5.7)
ALP SERPL-CCNC: 98 U/L (ref 35–120)
ALT SERPL-CCNC: 29 U/L
ANION GAP SERPL CALCULATED.3IONS-SCNC: 7 MMOL/L (ref 3–11)
AST SERPL-CCNC: 23 U/L
BILIRUB SERPL-MCNC: 0.9 MG/DL (ref 0.2–1)
BUN SERPL-MCNC: 16 MG/DL (ref 7–28)
CALCIUM SERPL-MCNC: 9.7 MG/DL (ref 8.5–10.5)
CHLORIDE SERPL-SCNC: 101 MMOL/L (ref 100–109)
CHOLEST SERPL-MCNC: 160 MG/DL
CHOLEST/HDLC SERPL: 3.9 {RATIO}
CO2 SERPL-SCNC: 32 MMOL/L (ref 21–31)
CREAT SERPL-MCNC: 1.06 MG/DL (ref 0.53–1.3)
CYTOLOGY CMNT CVX/VAG CYTO-IMP: ABNORMAL
GFR/BSA.PRED SERPLBLD CYS-BASED-ARV: 79 ML/MIN/{1.73_M2}
GLUCOSE SERPL-MCNC: 139 MG/DL (ref 65–99)
HDLC SERPL-MCNC: 41 MG/DL (ref 23–92)
LDLC SERPL CALC-MCNC: 89 MG/DL
NONHDLC SERPL-MCNC: 119 MG/DL
POTASSIUM SERPL-SCNC: 4.5 MMOL/L (ref 3.5–5.2)
PROT SERPL-MCNC: 6.8 G/DL (ref 6.3–8.3)
SODIUM SERPL-SCNC: 140 MMOL/L (ref 135–145)
T4 FREE SERPL-MCNC: 1.1 NG/DL (ref 0.61–1.12)
TRIGL SERPL-MCNC: 149 MG/DL
TSH SERPL-ACNC: 1.92 UIU/ML (ref 0.45–5.33)

## 2025-01-10 DIAGNOSIS — E78.2 MIXED HYPERLIPIDEMIA: ICD-10-CM

## 2025-01-10 RX ORDER — ATORVASTATIN CALCIUM 10 MG/1
10 TABLET, FILM COATED ORAL DAILY
Qty: 90 TABLET | Refills: 1 | Status: SHIPPED | OUTPATIENT
Start: 2025-01-10

## 2025-04-15 DIAGNOSIS — E03.9 ACQUIRED HYPOTHYROIDISM: ICD-10-CM

## 2025-04-16 RX ORDER — LEVOTHYROXINE SODIUM 100 UG/1
100 TABLET ORAL DAILY
Qty: 90 TABLET | Refills: 1 | Status: SHIPPED | OUTPATIENT
Start: 2025-04-16

## 2025-04-24 ENCOUNTER — TELEPHONE (OUTPATIENT)
Age: 65
End: 2025-04-24

## 2025-04-24 NOTE — TELEPHONE ENCOUNTER
Kimberly calls from [facility] and states that pt's insurance I requesting using alternative medication before trying Febuxostat. Alpurinol 100-300 was recommended. They will only approve Febuzostat if pt has tried all other alternatives or if the is special circumstances.     Cb:[phone]  Ref#:[id number]

## 2025-04-29 ENCOUNTER — OFFICE VISIT (OUTPATIENT)
Age: 65
End: 2025-04-29
Payer: COMMERCIAL

## 2025-04-29 VITALS
SYSTOLIC BLOOD PRESSURE: 126 MMHG | WEIGHT: 200.8 LBS | HEART RATE: 85 BPM | HEIGHT: 70 IN | OXYGEN SATURATION: 96 % | BODY MASS INDEX: 28.75 KG/M2 | DIASTOLIC BLOOD PRESSURE: 64 MMHG | TEMPERATURE: 96.7 F

## 2025-04-29 DIAGNOSIS — M15.0 PRIMARY GENERALIZED (OSTEO)ARTHRITIS: ICD-10-CM

## 2025-04-29 DIAGNOSIS — M72.2 PLANTAR FASCIITIS, RIGHT: ICD-10-CM

## 2025-04-29 DIAGNOSIS — M1A.09X0 IDIOPATHIC CHRONIC GOUT OF MULTIPLE SITES WITHOUT TOPHUS: ICD-10-CM

## 2025-04-29 DIAGNOSIS — L40.0 PLAQUE PSORIASIS: ICD-10-CM

## 2025-04-29 DIAGNOSIS — E03.9 ACQUIRED HYPOTHYROIDISM: ICD-10-CM

## 2025-04-29 DIAGNOSIS — R73.01 IMPAIRED FASTING GLUCOSE: ICD-10-CM

## 2025-04-29 DIAGNOSIS — M1A.0710 IDIOPATHIC CHRONIC GOUT OF RIGHT FOOT WITHOUT TOPHUS: Primary | ICD-10-CM

## 2025-04-29 DIAGNOSIS — G62.9 NEUROPATHY: ICD-10-CM

## 2025-04-29 DIAGNOSIS — M54.16 CHRONIC LUMBAR RADICULOPATHY: ICD-10-CM

## 2025-04-29 DIAGNOSIS — M47.816 LUMBAR SPONDYLOSIS: ICD-10-CM

## 2025-04-29 DIAGNOSIS — G62.9 SMALL FIBER NEUROPATHY: ICD-10-CM

## 2025-04-29 DIAGNOSIS — E53.8 DEFICIENCY OF VITAMIN B12: ICD-10-CM

## 2025-04-29 PROCEDURE — 99214 OFFICE O/P EST MOD 30 MIN: CPT | Performed by: INTERNAL MEDICINE

## 2025-04-29 NOTE — PROGRESS NOTES
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  We will continue to monitor serum uric acid to document that he remains in target range of less than 6.0.  Lab work dated 1/31/2024 significant for uric acid 3.2 in target range.  Continue to check serum uric acid to monitor efficacy of Uloric and continue to check CMP to rule out medication toxicity.  Prescription was given for lab work.  Plan follow-up 6 months.    Primary generalized (osteo)arthritis  Primary generalized osteoarthritis with interphalangeal osteoarthritis hands and feet with occasional thumb arthralgias.  Lumbar spondylosis with no current radicular symptoms.  Review of lumbar spine films from July 2022 significant for mild degenerative changes L5-S1.  SI joints with no inflammatory or erosive changes, however, mild degenerative changes noted.  No recent knee arthralgias.  Encouraged home exercise program with stretching.  Monitor labs for medication toxicity.    Plaque psoriasis  Plaque psoriasis generally quiescent.  She does have dry patches extensor elbow surfaces.  Rule out psoriatic arthritis in view of history of right plantar fasciitis.  Lumbar spine films from July 2022 with no evidence for axial disease.  There are degenerative changes noted L5-S1.  SI joints mild degenerative changes with no inflammatory component.  Will continue to monitor labs for disease activity and medication toxicity.  Follow-up dermatology yearly if needed.  Continue to monitor.    Plantar fasciitis, right  I encouraged the patient to be consistent with routine heel stretching to avoid recurrent flares.  Tight heel cords with some tenderness along right plantar fascia insertion.  I have suggested that he be consistent with the heel cord stretches.  Encouraged heel cups and plantar fasciitis is more prominent.  If symptoms worsen, will refer back to podiatrist.  Could consider physical therapy as well.   Avoid barefoot walking.  Follow-up 6 months or sooner if needed.    Lumbar spondylosis  Lumbar spondylosis with no current radicular symptoms.  Review of prior films significant for no evidence for spondylarthritis.  Encouraged home exercise program as tolerated.  Continue to monitor.    Small fiber neuropathy  Small fiber neuropathy generally stable with gabapentin.  Follow-up neurology.  Monitor labs for medication toxicity.    Deficiency of vitamin B12  Vitamin B12 low normal at 274 on the most recent lab work dated 3/17/2025.  Patient has been having neuropathic symptoms bilateral feet with numbness all the way into his mid calves.  It is possible that the low vitamin B12 could contribute.  Will add vitamin B12 1000 mcg daily.  I also suggested super vitamin B complex 1 daily.  Will check vitamin D level with the next lab work.  Will also check hemoglobin A1c to rule out diabetic neuropathy.  Continue to monitor.    Acquired hypothyroidism  Continue levothyroxine.  Follow-up primary care to monitor TSH.    Impaired fasting glucose  Impaired fasting glucose with normal hemoglobin A1c which was 5.3 in January 2024.  Have suggested that the patient try and limit carbohydrates.  He certainly could look into the Mediterranean diet.  He could also look into the anti-inflammatory diet or AIP on the Internet.  This would certainly work to decrease insulin resistance and promote weight loss which would be of benefit for the patient in view of his high BMI.  Follow-up 6 months or sooner if needed.  Continue to monitor.    Chronic lumbar radiculopathy  No current radicular symptoms.  He does have neuropathic symptoms in his feet into the mid calves.  Suspect this is more consistent with peripheral neuropathy than lumbar spondylosis.  His vitamin B12 was borderline low and I have asked him to start B12 1000 mcg daily and super B complex daily as well.  Hopefully he will have less neuropathic symptoms in the near future.  If  symptoms persist, would consider neurology consultation.  Continue to monitor.         Problem List Items Addressed This Visit       Primary generalized (osteo)arthritis    Primary generalized osteoarthritis with interphalangeal osteoarthritis hands and feet with occasional thumb arthralgias.  Lumbar spondylosis with no current radicular symptoms.  Review of lumbar spine films from July 2022 significant for mild degenerative changes L5-S1.  SI joints with no inflammatory or erosive changes, however, mild degenerative changes noted.  No recent knee arthralgias.  Encouraged home exercise program with stretching.  Monitor labs for medication toxicity.         Lumbar spondylosis    Lumbar spondylosis with no current radicular symptoms.  Review of prior films significant for no evidence for spondylarthritis.  Encouraged home exercise program as tolerated.  Continue to monitor.         Small fiber neuropathy    Small fiber neuropathy generally stable with gabapentin.  Follow-up neurology.  Monitor labs for medication toxicity.         Chronic lumbar radiculopathy    No current radicular symptoms.  He does have neuropathic symptoms in his feet into the mid calves.  Suspect this is more consistent with peripheral neuropathy than lumbar spondylosis.  His vitamin B12 was borderline low and I have asked him to start B12 1000 mcg daily and super B complex daily as well.  Hopefully he will have less neuropathic symptoms in the near future.  If symptoms persist, would consider neurology consultation.  Continue to monitor.         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  We will continue to monitor serum uric acid to document that he remains in target range of less than 6.0.  Lab work dated 1/31/2024 significant for uric acid 3.2 in target range.  Continue to check serum uric acid to monitor efficacy of Uloric and continue to check CMP to rule out medication toxicity.   Prescription was given for lab work.  Plan follow-up 6 months.         Relevant Orders    C-reactive protein    Sedimentation rate, automated    CBC and differential    Comprehensive metabolic panel    Urinalysis with microscopic    Uric acid    Acquired hypothyroidism    Continue levothyroxine.  Follow-up primary care to monitor TSH.         Relevant Orders    TSH, 3rd generation with Free T4 reflex    Plaque psoriasis    Plaque psoriasis generally quiescent.  She does have dry patches extensor elbow surfaces.  Rule out psoriatic arthritis in view of history of right plantar fasciitis.  Lumbar spine films from July 2022 with no evidence for axial disease.  There are degenerative changes noted L5-S1.  SI joints mild degenerative changes with no inflammatory component.  Will continue to monitor labs for disease activity and medication toxicity.  Follow-up dermatology yearly if needed.  Continue to monitor.         Plantar fasciitis, right    I encouraged the patient to be consistent with routine heel stretching to avoid recurrent flares.  Tight heel cords with some tenderness along right plantar fascia insertion.  I have suggested that he be consistent with the heel cord stretches.  Encouraged heel cups and plantar fasciitis is more prominent.  If symptoms worsen, will refer back to podiatrist.  Could consider physical therapy as well.  Avoid barefoot walking.  Follow-up 6 months or sooner if needed.         Impaired fasting glucose    Impaired fasting glucose with normal hemoglobin A1c which was 5.3 in January 2024.  Have suggested that the patient try and limit carbohydrates.  He certainly could look into the Mediterranean diet.  He could also look into the anti-inflammatory diet or AIP on the Internet.  This would certainly work to decrease insulin resistance and promote weight loss which would be of benefit for the patient in view of his high BMI.  Follow-up 6 months or sooner if needed.  Continue to monitor.          Relevant Orders    Hemoglobin A1C With EAG    Deficiency of vitamin B12    Vitamin B12 low normal at 274 on the most recent lab work dated 3/17/2025.  Patient has been having neuropathic symptoms bilateral feet with numbness all the way into his mid calves.  It is possible that the low vitamin B12 could contribute.  Will add vitamin B12 1000 mcg daily.  I also suggested super vitamin B complex 1 daily.  Will check vitamin D level with the next lab work.  Will also check hemoglobin A1c to rule out diabetic neuropathy.  Continue to monitor.         Relevant Orders    Vitamin B12     Other Visit Diagnoses         Idiopathic chronic gout of multiple sites without tophus          Neuropathy        Relevant Orders    Hemoglobin A1C With EAG                Reviewed records, labs, and imaging with the patient in detail.  Counseled patient.  Discussion regarding my findings and recommendations.  Office visit with documentation 35 min.    Subjective:      Patient ID: Kodi Jaquez is a 64 y.o. male.    Patient presents for follow up of gout quiescent on Uloric, with history of primary generalized osteoarthritis with interphalangeal osteoarthritis hands and feet, and lumbar spondylosis.  Other medical problems include history plaque psoriasis generally quiescent, small fiber neuropathy, primary hypothyroidism, hyperlipidemia, history of T2-3 schwannoma, and mechanical foot deformities.  Patient has had chronic dysesthesias bilateral feet.  He had been diagnosed with plantar fasciitis right foot, and was treated with injection and orthotics by Podiatry June 2022. He is not always consistent with home stretches, and has occasional symptoms.  Back and hip symptoms improved and he has not had any radicular symptoms right lower extremity.  He does note numbness in his feet up to his mid calves.  He has no burning sensation.  Morning stiffness is seconds duration.  He has left thumb arthralgias depending on activity level  "with his thumb.  Plaque psoriasis is quiescent however he does have dry patches on his extensor elbow surfaces.  Patient had colonoscopy 2022 with 2 tubular adenomas, one in the transverse colon, one in the descending colon.  Review of systems otherwise unremarkable.    Lab work dated 3/17/2025 significant for vitamin B12 274.  Vitamin B6 30.5.  TSH 2.85.  Lab work dated 12/6/2024 significant for vitamin D 40.  TSH 1.92 with free T41.10.  Total cholesterol 160.  Triglyceride 149.  HDL 41.  LDL 89.  Calcium 9.7.  Random glucose 139.  Estimated GFR 79.  Review of lab work dated 1/31/2024 significant for white blood cell count 4.3 with absolute neutrophil count 1.9.  Platelets 196.  Glucose 120.  Creatinine 1.5 with estimated GFR 72.  CRP less than 1.0.  Sed rate 1.  Uric acid in target range at 3.2.  Hemoglobin A1c 5.3.  PSA 1.23.  Vitamin D 33.    Review of x-rays dated 7/16/2022 reveal LS-spine with mild degenerative disc disease.  Mild SI degenerative changes.  Right foot small calcaneal spur.        Allergies  Allergies   Allergen Reactions    Penicillin V Hives     Unknown    Penicillins      \"and other cillins\"       Home Medications    Current Outpatient Medications:     atorvastatin (LIPITOR) 10 mg tablet, TAKE 1 TABLET DAILY, Disp: 90 tablet, Rfl: 1    Cholecalciferol 50 MCG (2000 UT) CAPS, Take 1 capsule by mouth daily, Disp: , Rfl:     citalopram (CeleXA) 20 mg tablet, TAKE 1 TABLET(20 MG) BY MOUTH DAILY, Disp: 30 tablet, Rfl: 5    febuxostat (ULORIC) 80 MG TABS, TAKE 1 TABLET DAILY, Disp: 90 tablet, Rfl: 1    gabapentin (NEURONTIN) 300 mg capsule, TAKE 1 CAPSULE(300 MG) BY MOUTH TWICE DAILY FOR 14 DAYS, Disp: 28 capsule, Rfl: 0    levothyroxine 100 mcg tablet, TAKE 1 TABLET DAILY, Disp: 90 tablet, Rfl: 1    fexofenadine-pseudoephedrine (ALLEGRA-D 24) 180-240 MG per 24 hr tablet, Take 1 tablet by mouth daily (Patient not taking: Reported on 5/30/2024), Disp: 30 tablet, Rfl: 3    levothyroxine 100 mcg " tablet, Take 1 tablet (100 mcg total) by mouth daily (Patient not taking: Reported on 4/29/2025), Disp: 30 tablet, Rfl: 0    Past Medical History  Past Medical History:   Diagnosis Date    Anxiety 9/15/2022    Disease of thyroid gland     hypo    Gout     Hyperlipidemia     Hypothyroidism     Neuropathy     Osteoarthritis     Plantar fasciitis of right foot     Schwannoma        Past Surgical History   Past Surgical History:   Procedure Laterality Date    TONSILLECTOMY         Family History   Family History   Problem Relation Age of Onset    Diabetes Mother     Emphysema Mother     Arthritis Mother     Lung cancer Father     Cancer Father     Heart disease Sister     Diabetes Sister     No Known Problems Son     Heart disease Maternal Grandfather     Diabetes Paternal Grandmother     Gout Family     Diabetes Family     Cancer Family     Hypertension Family     Heart disease Family        The following portions of the patient's history were reviewed and updated as appropriate: allergies, current medications, past family history, past medical history, past social history, past surgical history, and problem list.    Review of Systems   Constitutional:  Negative for chills and fever.   HENT:  Negative for ear pain and sore throat.    Eyes:  Negative for pain and visual disturbance.   Respiratory:  Negative for cough and shortness of breath.    Cardiovascular:  Negative for chest pain and palpitations.   Gastrointestinal:  Negative for abdominal pain and vomiting.   Genitourinary:  Negative for dysuria and hematuria.   Musculoskeletal:  Positive for arthralgias and back pain.        History right plantar fasciitis. Plantar fascia stable with using orthotics. Rare left thumb arthralgias. Am gel seconds.   Skin:  Positive for rash. Negative for color change.        Left elbow extensor surface pruritic rash resolved.   Neurological:  Positive for numbness. Negative for seizures and syncope.        Dysesthesias  "ankles/feet  Balance difficulty   All other systems reviewed and are negative.        Objective:      /64 (BP Location: Right arm, Patient Position: Sitting, Cuff Size: Adult)   Pulse 85   Temp (!) 96.7 °F (35.9 °C) (Tympanic)   Ht 5' 10\" (1.778 m)   Wt 91.1 kg (200 lb 12.8 oz)   SpO2 96%   BMI 28.81 kg/m²          Physical Exam  Vitals reviewed.   Constitutional:       Appearance: Normal appearance.   HENT:      Head: Normocephalic.      Nose:      Comments: Nose and throat unremarkable    Cardiovascular:      Rate and Rhythm: Regular rhythm.   Pulmonary:      Breath sounds: Normal breath sounds.   Abdominal:      Palpations: Abdomen is soft.     Musculoskeletal:      Comments: Neck slightly decreased lateral flexion.  Shoulders, elbows, and wrists full range of motion.  Tinel's negative bilaterally.  Hands squaring 1st carpometacarpal joints bilaterally with Heberden's and early Brandi's nodes.  No synovitis noted.  Straight leg raising negative bilaterally.  Marked spasm noted lumbosacral paraspinals. Hips full range of motion.  Knees full range of motion with patellofemoral crepitus. Ankles full range of motion.  Feet rearfoot hyperpronation with midfoot cavus deformities, hallux valgus deformities left greater than right, early tailor's bunions, and tenderness along the right plantar fascia insertion. Tight heel cords.     Skin:     Comments: No psoriasis. Slight dry patches extensor surface elbows right>left.  No onycholysis.  No nail pitting.               This note was written in part using the assistance of the [employer] [id number] microphone system. Those portions using this system have been dictated and not read.  "

## 2025-05-17 PROBLEM — E53.8 DEFICIENCY OF VITAMIN B12: Status: ACTIVE | Noted: 2025-05-17

## 2025-05-18 NOTE — ASSESSMENT & PLAN NOTE
Plaque psoriasis generally quiescent.  She does have dry patches extensor elbow surfaces.  Rule out psoriatic arthritis in view of history of right plantar fasciitis.  Lumbar spine films from July 2022 with no evidence for axial disease.  There are degenerative changes noted L5-S1.  SI joints mild degenerative changes with no inflammatory component.  Will continue to monitor labs for disease activity and medication toxicity.  Follow-up dermatology yearly if needed.  Continue to monitor.

## 2025-05-18 NOTE — ASSESSMENT & PLAN NOTE
Primary generalized osteoarthritis with interphalangeal osteoarthritis hands and feet with occasional thumb arthralgias.  Lumbar spondylosis with no current radicular symptoms.  Review of lumbar spine films from July 2022 significant for mild degenerative changes L5-S1.  SI joints with no inflammatory or erosive changes, however, mild degenerative changes noted.  No recent knee arthralgias.  Encouraged home exercise program with stretching.  Monitor labs for medication toxicity.

## 2025-05-18 NOTE — ASSESSMENT & PLAN NOTE
Lumbar spondylosis with no current radicular symptoms.  Review of prior films significant for no evidence for spondylarthritis.  Encouraged home exercise program as tolerated.  Continue to monitor.

## 2025-05-18 NOTE — ASSESSMENT & PLAN NOTE
No current radicular symptoms.  He does have neuropathic symptoms in his feet into the mid calves.  Suspect this is more consistent with peripheral neuropathy than lumbar spondylosis.  His vitamin B12 was borderline low and I have asked him to start B12 1000 mcg daily and super B complex daily as well.  Hopefully he will have less neuropathic symptoms in the near future.  If symptoms persist, would consider neurology consultation.  Continue to monitor.   Subsequent Stages Histo Method Verbiage: Using a similar technique to that described above, a thin layer of tissue was removed from all areas where tumor was visible on the previous stage.  The tissue was again oriented, mapped, dyed, and processed as above.

## 2025-05-18 NOTE — ASSESSMENT & PLAN NOTE
Vitamin B12 low normal at 274 on the most recent lab work dated 3/17/2025.  Patient has been having neuropathic symptoms bilateral feet with numbness all the way into his mid calves.  It is possible that the low vitamin B12 could contribute.  Will add vitamin B12 1000 mcg daily.  I also suggested super vitamin B complex 1 daily.  Will check vitamin D level with the next lab work.  Will also check hemoglobin A1c to rule out diabetic neuropathy.  Continue to monitor.

## 2025-05-18 NOTE — ASSESSMENT & PLAN NOTE
Gout quiescent on Uloric 80 mg daily.  We will continue to monitor serum uric acid to document that he remains in target range of less than 6.0.  Lab work dated 1/31/2024 significant for uric acid 3.2 in target range.  Continue to check serum uric acid to monitor efficacy of Uloric and continue to check CMP to rule out medication toxicity.  Prescription was given for lab work.  Plan follow-up 6 months.

## 2025-05-18 NOTE — ASSESSMENT & PLAN NOTE
Impaired fasting glucose with normal hemoglobin A1c which was 5.3 in January 2024.  Have suggested that the patient try and limit carbohydrates.  He certainly could look into the Mediterranean diet.  He could also look into the anti-inflammatory diet or AIP on the Internet.  This would certainly work to decrease insulin resistance and promote weight loss which would be of benefit for the patient in view of his high BMI.  Follow-up 6 months or sooner if needed.  Continue to monitor.

## 2025-05-18 NOTE — ASSESSMENT & PLAN NOTE
I encouraged the patient to be consistent with routine heel stretching to avoid recurrent flares.  Tight heel cords with some tenderness along right plantar fascia insertion.  I have suggested that he be consistent with the heel cord stretches.  Encouraged heel cups and plantar fasciitis is more prominent.  If symptoms worsen, will refer back to podiatrist.  Could consider physical therapy as well.  Avoid barefoot walking.  Follow-up 6 months or sooner if needed.

## 2025-05-18 NOTE — ASSESSMENT & PLAN NOTE
Small fiber neuropathy generally stable with gabapentin.  Follow-up neurology.  Monitor labs for medication toxicity.

## 2025-05-19 ENCOUNTER — TELEPHONE (OUTPATIENT)
Age: 65
End: 2025-05-19

## 2025-05-19 NOTE — TELEPHONE ENCOUNTER
"Called [facility] to obtain lab results. Spoke with Kevin who confirmed labs are on file for dos 4/26/2025. Kevin stated results not sent due to not having orders, and when asked why they are not in the patient's MyChart, Kevin stated the results were not uploaded to [facility] my chart since the ordering provider is with [facility].     Asked Kevin if the results can be faxed to the [facility] Office, he advised they can and the fax they have on file for that office is [phone].     Kevin was advised that is not a valid fax, asked if they can be sent to another fax number. He then stated a request for the lab results is to be sent on, letter head,  to [phone]. In the request, the correct fax number can be given and should be updated with [facility].     Asked if the lab orders can be faxed to [facility] with request on cover sheet, and he stated the request would be better had written as \"that is looked for\".       Please send request to [facility] fax #[phone] for labs completed on 4/26/2025 as ordered by Dr. Ricks.          "

## 2025-05-19 NOTE — TELEPHONE ENCOUNTER
Patient called stating he had labs done 4/26/2025 and when he saw Dr. Ricks, the results were not available. Patient stated he does not see them in his MyChart and was checking if the results were received by Dr. Ricks.   Confirmed patient had the labs completed at [facility] in [place]. Reviewed chart and Care Everywhere updated, lab results not on file. Advised patient will call [facility] regarding labs. Once received and Dr. Ricks reviews, he will be contacted.   Patient verbalized understanding.

## 2025-05-20 NOTE — TELEPHONE ENCOUNTER
Called and spoke with Nesha with [facility]. She is faxing over the patients lab results from 4/26/2025.

## 2025-05-23 DIAGNOSIS — F41.9 ANXIETY DISORDER, UNSPECIFIED TYPE: ICD-10-CM

## 2025-05-25 RX ORDER — CITALOPRAM HYDROBROMIDE 20 MG/1
20 TABLET ORAL DAILY
Qty: 30 TABLET | Refills: 5 | Status: SHIPPED | OUTPATIENT
Start: 2025-05-25

## 2025-06-05 ENCOUNTER — TELEPHONE (OUTPATIENT)
Dept: FAMILY MEDICINE CLINIC | Facility: CLINIC | Age: 65
End: 2025-06-05

## 2025-06-27 ENCOUNTER — TELEPHONE (OUTPATIENT)
Dept: RHEUMATOLOGY | Facility: CLINIC | Age: 65
End: 2025-06-27

## 2025-07-09 DIAGNOSIS — E78.2 MIXED HYPERLIPIDEMIA: ICD-10-CM

## 2025-07-10 RX ORDER — ATORVASTATIN CALCIUM 10 MG/1
10 TABLET, FILM COATED ORAL DAILY
Qty: 90 TABLET | Refills: 0 | Status: SHIPPED | OUTPATIENT
Start: 2025-07-10